# Patient Record
Sex: MALE | Race: WHITE | NOT HISPANIC OR LATINO | Employment: OTHER | URBAN - METROPOLITAN AREA
[De-identification: names, ages, dates, MRNs, and addresses within clinical notes are randomized per-mention and may not be internally consistent; named-entity substitution may affect disease eponyms.]

---

## 2019-06-20 RX ORDER — ASPIRIN 81 MG/1
81 TABLET ORAL DAILY
COMMUNITY

## 2019-06-20 RX ORDER — ATORVASTATIN CALCIUM 20 MG/1
20 TABLET, FILM COATED ORAL DAILY
COMMUNITY

## 2019-06-20 RX ORDER — FELODIPINE 10 MG/1
10 TABLET, EXTENDED RELEASE ORAL DAILY
COMMUNITY

## 2019-06-20 RX ORDER — TAMSULOSIN HYDROCHLORIDE 0.4 MG/1
1 CAPSULE ORAL NIGHTLY
COMMUNITY

## 2019-06-21 ENCOUNTER — OFFICE VISIT (OUTPATIENT)
Dept: NEUROSURGERY | Facility: CLINIC | Age: 79
End: 2019-06-21

## 2019-06-21 VITALS — HEIGHT: 72 IN | RESPIRATION RATE: 17 BRPM | WEIGHT: 193.4 LBS | BODY MASS INDEX: 26.19 KG/M2

## 2019-06-21 DIAGNOSIS — M54.16 LUMBAR RADICULOPATHY: ICD-10-CM

## 2019-06-21 DIAGNOSIS — M48.062 SPINAL STENOSIS OF LUMBAR REGION WITH NEUROGENIC CLAUDICATION: Primary | ICD-10-CM

## 2019-06-21 DIAGNOSIS — M51.36 DDD (DEGENERATIVE DISC DISEASE), LUMBAR: ICD-10-CM

## 2019-06-21 PROCEDURE — 99203 OFFICE O/P NEW LOW 30 MIN: CPT | Performed by: NEUROLOGICAL SURGERY

## 2019-06-21 NOTE — PROGRESS NOTES
NAME: DARREL VELIZ   DOS: 2019  : 1940  PCP: Norman Gomez DO    Chief Complaint:    Chief Complaint   Patient presents with   • Low back, left buttock pain   • Neck stiffness       History of Present Illness:  78 y.o. male   I saw this 78-year-old gentleman very pleasant gentleman with a history of some chronic axial back pain as well as cervical issues    He has a history of chronic back pain is worse on the left-hand side and occasionally radiates down into his hip it does not go down into the legs he denies any clear-cut weakness and he does have occasional symptoms of neurogenic claudication but they are few and far between he has pain in his sacral area as well he denies cauda equina syndrome    More recently he has had problems with numbness in his left upper extremity he also complains of some cervical left paraspinal pain as well he denies any weakness numbness tingling in his other hand he denies any other cranial symptoms    PMHX  Allergies:  No Known Allergies  Medications    Current Outpatient Medications:   •  aspirin 81 MG EC tablet, Take 81 mg by mouth Daily., Disp: , Rfl:   •  atorvastatin (LIPITOR) 20 MG tablet, Take 20 mg by mouth Daily., Disp: , Rfl:   •  felodipine (PLENDIL) 10 MG 24 hr tablet, Take 10 mg by mouth Daily., Disp: , Rfl:   •  tamsulosin (FLOMAX) 0.4 MG capsule 24 hr capsule, Take 1 capsule by mouth Every Night., Disp: , Rfl:   Past Medical History:  Past Medical History:   Diagnosis Date   • Cancer (CMS/HCC)     Prostate   • Heart disease    • Low back pain      Past Surgical History:  Past Surgical History:   Procedure Laterality Date   • ROTATOR CUFF REPAIR Right      Social Hx:  Social History     Tobacco Use   • Smoking status: Never Smoker   • Smokeless tobacco: Never Used   Substance Use Topics   • Alcohol use: No     Frequency: Never   • Drug use: No     Family Hx:  Family History   Problem Relation Age of Onset   • Hypertension Father       Review of Systems:        Review of Systems   Constitutional: Negative for activity change, appetite change, chills, diaphoresis, fatigue, fever and unexpected weight change.   HENT: Negative for congestion, dental problem, drooling, ear discharge, ear pain, facial swelling, hearing loss, mouth sores, nosebleeds, postnasal drip, rhinorrhea, sinus pressure, sneezing, sore throat, tinnitus, trouble swallowing and voice change.    Eyes: Negative for photophobia, pain, discharge, redness, itching and visual disturbance.   Respiratory: Negative for apnea, cough, choking, chest tightness, shortness of breath, wheezing and stridor.    Cardiovascular: Negative for chest pain, palpitations and leg swelling.   Gastrointestinal: Negative for abdominal distention, abdominal pain, anal bleeding, blood in stool, constipation, diarrhea, nausea, rectal pain and vomiting.   Endocrine: Negative for cold intolerance, heat intolerance, polydipsia, polyphagia and polyuria.   Genitourinary: Negative for decreased urine volume, difficulty urinating, dysuria, enuresis, flank pain, frequency, genital sores, hematuria and urgency.   Musculoskeletal: Positive for back pain and neck stiffness. Negative for arthralgias, gait problem, joint swelling, myalgias and neck pain.   Skin: Negative for color change, pallor, rash and wound.   Allergic/Immunologic: Negative for environmental allergies, food allergies and immunocompromised state.   Neurological: Negative for dizziness, tremors, seizures, syncope, facial asymmetry, speech difficulty, weakness, light-headedness, numbness and headaches.   Hematological: Negative for adenopathy. Does not bruise/bleed easily.   Psychiatric/Behavioral: Negative for agitation, behavioral problems, confusion, decreased concentration, dysphoric mood, hallucinations, self-injury, sleep disturbance and suicidal ideas. The patient is not nervous/anxious and is not hyperactive.    All other systems reviewed and are  negative.       I have reviewed this note template and all pertinent parts of the review of systems social, family history, surgical history and medication list    Physical Examination:  Vitals:    06/21/19 1359   Resp: 17      General Appearance:   Well developed, well nourished, well groomed, alert, and cooperative.  Neurological examination:  Neurologic Exam    Vital signs were reviewed and documented in the chart  Patient appeared in good neurologic function with normal comprehension fluent speech  Mood and affect are normal  Sense of smell deferred    Pupils symmetric equally reactive funduscopic exam not visualized   Visual fields intact to confrontation  Extraocular movements intact  Face motor function is symmetric  Facial sensations normal  Hearing intact to finger rub hearing intact to finger rub  Tongue is midline  Palate symmetric  Swallowing normal  Shoulder shrug normal    Muscle bulk and tone normal  5 out of 5 strength no motor drift  Gait normal intact  Negative Romberg  No clonus long tract signs or myelopathy  He has no Pike's, Tinel's bilateral  Reflexes symmetric he is extremely brisk in his upper extremities more so than his lower extremities he has trace ankle reflexes bilateral  Mild pedal edema noted and extremities skin appears normal  He has no clonus     He has decreased vibratory sensation more so on the right arm and leg than the left  His back is clear without lesions      Straight leg raise sign absent  No signs of intrinsic hip dysfunction with relatively good range of motion for age  Back is without any lesions or abnormality  Feet are warm and well perfused        Review of Imaging/DATA:  I reviewed his MRI is relatively impressive he has disease at 2 3 that is mild to moderate he has significant spinal stenosis at L3-4 L4-5 and L5-S1 he is mostly on the left at 5 1  Diagnoses/Plan:    Mr. Gunderson is a 78 y.o. male   I discussed with him the treatment options I think for his  low back he definitely has some mid sacral SI joint pain he has severe lateral recess syndrome and I think he is a candidate for a lumbar laminectomy at 4 5 with undercutting 3 4 he may be able to do this for a left-sided approach only I think you do very well with the surgery that being said I do not like the asymmetry in his vibratory sensation also do not like his market hyperreflexia in his upper extremities I am to get a cervical MRI as well as EMG of his left upper extremity and lower extremities to look for peripheral neuropathy I would like to also lumbar flexion-extension film to exclude mechanical instability.

## 2019-07-01 ENCOUNTER — HOSPITAL ENCOUNTER (OUTPATIENT)
Dept: MRI IMAGING | Facility: HOSPITAL | Age: 79
Discharge: HOME | End: 2019-07-01
Admitting: NEUROLOGICAL SURGERY

## 2019-07-01 ENCOUNTER — OFFICE VISIT (OUTPATIENT)
Dept: NEUROSURGERY | Facility: CLINIC | Age: 79
End: 2019-07-01

## 2019-07-01 ENCOUNTER — HOSPITAL ENCOUNTER (OUTPATIENT)
Dept: GENERAL RADIOLOGY | Facility: HOSPITAL | Age: 79
Discharge: HOME | End: 2019-07-01

## 2019-07-01 VITALS
SYSTOLIC BLOOD PRESSURE: 148 MMHG | TEMPERATURE: 98.1 F | BODY MASS INDEX: 24.26 KG/M2 | WEIGHT: 189 LBS | HEIGHT: 74 IN | DIASTOLIC BLOOD PRESSURE: 70 MMHG

## 2019-07-01 DIAGNOSIS — M48.062 SPINAL STENOSIS OF LUMBAR REGION WITH NEUROGENIC CLAUDICATION: ICD-10-CM

## 2019-07-01 DIAGNOSIS — G95.9 CERVICAL MYELOPATHY (HCC): Primary | ICD-10-CM

## 2019-07-01 DIAGNOSIS — M48.02 SPINAL STENOSIS IN CERVICAL REGION: ICD-10-CM

## 2019-07-01 PROCEDURE — 72141 MRI NECK SPINE W/O DYE: CPT

## 2019-07-01 PROCEDURE — 99214 OFFICE O/P EST MOD 30 MIN: CPT | Performed by: NEUROLOGICAL SURGERY

## 2019-07-01 PROCEDURE — 72114 X-RAY EXAM L-S SPINE BENDING: CPT

## 2019-07-01 NOTE — PROGRESS NOTES
"  NAME: DARREL VELIZ   DOS: 2019  : 1940  PCP: Norman Gomez DO    Chief Complaint:    Chief Complaint   Patient presents with   • Left sided neck pain     F/u MRI, XR & EMG   • Back Pain       History of Present Illness:  78 y.o. male   Follow-up back and left leg pain no new symptomatology denies any flagrant radiculopathy only has left parascapular pain as well as left paraspinal pain denies significant rheumatoid disease neurogenic claudication is able to walk over a mile and \"feels better \"    PMHX  Allergies:  No Known Allergies  Medications    Current Outpatient Medications:   •  aspirin 81 MG EC tablet, Take 81 mg by mouth Daily., Disp: , Rfl:   •  atorvastatin (LIPITOR) 20 MG tablet, Take 20 mg by mouth Daily., Disp: , Rfl:   •  felodipine (PLENDIL) 10 MG 24 hr tablet, Take 10 mg by mouth Daily., Disp: , Rfl:   •  tamsulosin (FLOMAX) 0.4 MG capsule 24 hr capsule, Take 1 capsule by mouth Every Night., Disp: , Rfl:   Past Medical History:  Past Medical History:   Diagnosis Date   • Cancer (CMS/HCC)     Prostate   • Heart disease    • Low back pain      Past Surgical History:  Past Surgical History:   Procedure Laterality Date   • ROTATOR CUFF REPAIR Right      Social Hx:  Social History     Tobacco Use   • Smoking status: Never Smoker   • Smokeless tobacco: Never Used   Substance Use Topics   • Alcohol use: No     Frequency: Never   • Drug use: No     Family Hx:  Family History   Problem Relation Age of Onset   • Hypertension Father      Review of Systems:        Review of Systems   Constitutional: Negative for activity change, appetite change, chills, diaphoresis, fatigue, fever and unexpected weight change.   HENT: Negative for congestion, dental problem, drooling, ear discharge, ear pain, facial swelling, hearing loss, mouth sores, nosebleeds, postnasal drip, rhinorrhea, sinus pressure, sneezing, sore throat, tinnitus, trouble swallowing and voice change.    Eyes: Negative for " photophobia, pain, discharge, redness, itching and visual disturbance.   Respiratory: Negative for apnea, cough, choking, chest tightness, shortness of breath, wheezing and stridor.    Cardiovascular: Negative for chest pain, palpitations and leg swelling.   Gastrointestinal: Negative for abdominal distention, abdominal pain, anal bleeding, blood in stool, constipation, diarrhea, nausea, rectal pain and vomiting.   Endocrine: Negative for cold intolerance, heat intolerance, polydipsia, polyphagia and polyuria.   Genitourinary: Negative for decreased urine volume, difficulty urinating, dysuria, enuresis, flank pain, frequency, genital sores, hematuria and urgency.   Musculoskeletal: Positive for back pain and neck stiffness. Negative for arthralgias, gait problem, joint swelling, myalgias and neck pain.   Skin: Negative for color change, pallor, rash and wound.   Allergic/Immunologic: Negative for environmental allergies, food allergies and immunocompromised state.   Neurological: Negative for dizziness, tremors, seizures, syncope, facial asymmetry, speech difficulty, weakness, light-headedness, numbness and headaches.   Hematological: Negative for adenopathy. Does not bruise/bleed easily.   Psychiatric/Behavioral: Negative for agitation, behavioral problems, confusion, decreased concentration, dysphoric mood, hallucinations, self-injury, sleep disturbance and suicidal ideas. The patient is not nervous/anxious and is not hyperactive.    All other systems reviewed and are negative.           Physical Examination:  Vitals:    07/01/19 1447   BP: 148/70   Temp: 98.1 °F (36.7 °C)      General Appearance:   Well developed, well nourished, well groomed, alert, and cooperative.  Neurological examination:  Neurologic Exam  He is awake alert and has good range of motion in his neck and arms he has no layer meets phenomenon    His strength is good in his upper extremities    He has good strength lower extremities    Vibratory  sensation is intact    He is very hyperreflexic in his arms but he has no Pike's    He has no clonus his toes are downgoing    His gait appears normal for age    Review of Imaging/DATA:  His MRI of the cervical spine was reviewed that shows a relatively tight stenosis at C to 3 there is a minimal amount of encephalomalacia he has a left C4-5 disc flexion-extension lumbar spine unremarkable  Diagnoses/Plan:    Mr. Gunderson is a 78 y.o. male   This is an interesting case the gentleman comes with left paraspinal pain he presents as well as some occasional left para spinal cervical pain.  I got an MRI to check for his hyperreflexia and he indeed has very high-grade stenosis of the cervical spine with encephalomalacia that appears that could be chronic.  He denies any ictus he has no liver meets he has no Pike's he has mild hyperreflexia is able to walk and has no clonus.  From my standpoint we discussed that at his age we counseled him on fall risk precautions he is in a see me back to monitor his exam in 6 months.  He does have significant lumbar spinal stenosis as well and from a neurosurgical standpoint he would be a candidate for a C3 laminectomy with undercutting C2 once we ensure stability if he progressed.  If he does develop symptoms of neurogenic claudication we could also contemplate treatment there as well.  He seems very comfort with the plan he wishes to avoid surgery at all costs if not necessary given the incidental nature of his cervical spine MRI I think that is reasonable.  We will monitor him and see him back     Normal for race

## 2019-07-10 ENCOUNTER — HOSPITAL ENCOUNTER (OUTPATIENT)
Dept: PHYSICAL THERAPY | Facility: HOSPITAL | Age: 79
Setting detail: THERAPIES SERIES
Discharge: HOME | End: 2019-07-10

## 2019-07-10 ENCOUNTER — TELEPHONE (OUTPATIENT)
Dept: PAIN MEDICINE | Facility: CLINIC | Age: 79
End: 2019-07-10

## 2019-07-10 DIAGNOSIS — M48.062 SPINAL STENOSIS OF LUMBAR REGION WITH NEUROGENIC CLAUDICATION: Primary | ICD-10-CM

## 2019-07-10 PROCEDURE — 97161 PT EVAL LOW COMPLEX 20 MIN: CPT | Performed by: PHYSICAL THERAPIST

## 2019-07-10 NOTE — THERAPY EVALUATION
Outpatient Physical Therapy Ortho Initial Evaluation  [facility]     Patient Name: Frank Gunderson  : 1940  MRN: [id number]  Today's Date: 7/10/2019      Visit Date: 07/10/2019    There is no problem list on file for this patient.       Past Medical History:   Diagnosis Date   • Cancer (CMS/[facility])     Prostate   • Heart disease    • Low back pain         Past Surgical History:   Procedure Laterality Date   • ROTATOR CUFF REPAIR Right        Visit Dx:     ICD-10-CM ICD-9-CM   1. Spinal stenosis of lumbar region with neurogenic claudication M48.062 724.03         Patient History     Row Name 07/10/19 0800             History    Chief Complaint  Pain  -CR      Type of Pain  Back pain  -CR      Date Current Problem(s) Began  07/10/14  -CR      Brief Description of Current Complaint  77 yo male reports history of llumbar stenosis as well as cervical pathology with left hip pain with ambulation and prolonged standing.  Client indicates constant left finger tingling.  Chief complaint appears to be left hip discomfort at times of increased activity.  Client denies falls or balance difficulty.    -CR      Previous treatment for THIS PROBLEM  Injections  -CR      Patient/Caregiver Goals  Relieve pain  -CR      Current Tobacco Use  no  -CR      Smoking Status  no  -CR      Hand Dominance  right-handed  -CR      What clinical tests have you had for this problem?  MRI  -CR         Pain     Pain Location  Hip  -CR      Pain Frequency  Several days a week;Intermittent  -CR      What Performance Factors Make the Current Problem(s) WORSE?  satnding, walking slow  -CR      Tolerance Time- Standing  30  -CR      Is your sleep disturbed?  Yes  -CR      Is medication used to assist with sleep?  No  -CR      Difficulties at work?  na  -CR      Difficulties with ADL's?  singing in choir  -CR      Difficulties with recreational activities?  yes  -CR         Fall Risk Assessment    Any falls in the past year:  No  -CR       Does patient have a fear of falling  No  -CR         Daily Activities    Primary Language  English  -CR      Patient is concerned about/has problems with  Performing sports, recreation, and play activities;Performing home management (household chores, shopping, care of dependents)  -CR        User Key  (r) = Recorded By, (t) = Taken By, (c) = Cosigned By    Initials Name Provider Type    Umer Johnson, MARLEN Physical Therapist          PT Ortho     Row Name 07/10/19 0900       Posture/Observations    Alignment Options  Forward head;Lumbar lordosis  -CR    Forward Head  Moderate  -CR    Lumbar lordosis  Decreased  -CR    Posture/Observations Comments  Maintains slightly forward flexed posture  -CR       Quarter Clearing    Quarter Clearing  Lower Quarter Clearing  -CR       DTR- Lower Quarter Clearing    Patellar tendon (L2-4)  2- Normal response  -CR    Achilles tendon (S1-2)  2- Normal response  -CR       Neural Tension Signs- Lower Quarter Clearing    Slump  Negative  -CR    SLR  Negative  -CR       Lumbar ROM Screen- Lower Quarter Clearing    Lumbar Flexion  Normal  -CR    Lumbar Extension  Impaired  -CR       SI/Hip Screen- Lower Quarter Clearing    Erlin's/Nicolas's test  Left:;Negative  -CR    Posterior thigh sheer  Left:;Negative  -CR       Special Tests/Palpation    Special Tests/Palpation  Lumbar/SI;Hip  -CR       Lumbosacral Accessory Motions    Lumbosacral Accessory Motions Tested?  Yes  -CR    PA Glide- L1  Hypomobile  -CR    PA Glide- L2  Hypomobile  -CR    PA Glide- L3  Hypomobile  -CR    PA Glide- L4  Hypomobile  -CR    PA Glide- L5  Hypomobile  -CR       Lumbar/SI Special Tests    Stork Test (SI Dysfunction)  Left:;Negative  -CR    Slump Test (Neural Tension)  Left:;Negative  -CR    SLR (Neural Tension)  Left:;Negative  -CR    ERLIN (hip vs. SI Dysfunction)  Left:;Negative  -CR       Lumbosacral Palpation    Lumbosacral Palpation?  Yes  -CR    Piriformis  Tender;Left:  -CR    Erector Spinae  (Paraspinals)  Guarded/taut  -CR       Hip/Thigh Palpation    Hip/Thigh Palpation?  No Tenderness/Abnormality  -CR       Hip Special Tests    ERLIN (hip vs SI pathology)  Negative  -CR       General ROM    LT Lower Ext  Lt Hip Flexion;Lt Hip External Rotation;Lt Hip Internal Rotation  -CR       Left Lower Ext    Lt Hip Flexion PROM  120  -CR    Lt Hip External Rotation PROM  45  -CR    Lt Hip Internal Rotation PROM  15  -CR       Flexibility    Flexibility Tested?  Lower Extremity  -CR       Lower Extremity Flexibility    Hamstrings  Bilateral:;Moderately limited  -CR    Quadriceps  Right:;Severely limited;Left:;Moderately limited  -CR      User Key  (r) = Recorded By, (t) = Taken By, (c) = Cosigned By    Initials Name Provider Type    Umer Johnson PT Physical Therapist                      Therapy Education  Education Details: Client provided written HEP including LTR, Lumbar flexion, and piriformis stretching  Given: HEP, Pain management, Symptoms/condition management, Posture/body mechanics  Program: New  How Provided: Verbal, Demonstration, Written  Provided to: Patient  Level of Understanding: Verbalized, Demonstrated     PT OP Goals     Row Name 07/10/19 0900          PT Short Term Goals    STG Date to Achieve  08/07/19  -CR     STG 1  client will demonstrates indpendence with initial HEP  -CR     STG 1 Progress  New  -CR     STG 2  client will report increase in standing tolerance to 1 hour without pain  -CR     STG 2 Progress  New  -CR        Long Term Goals    LTG Date to Achieve  09/04/19  -CR        Time Calculation    PT Goal Re-Cert Due Date  10/08/19  -CR       User Key  (r) = Recorded By, (t) = Taken By, (c) = Cosigned By    Initials Name Provider Type    Umer Johnson PT Physical Therapist          PT Assessment/Plan     Row Name 07/10/19 1026          PT Assessment    Functional Limitations  Performance in leisure activities;Performance in self-care ADL  -CR     Impairments   Gait;Poor body mechanics;Posture;Range of motion;Pain;Joint mobility  -CR     Assessment Comments  77 yo male arrives with evolving symptoms of low complexity. He reports history of chronic low back pain.  Client will benefit from skilled services to address impairments and work towards self management.    -CR     Please refer to paper survey for additional self-reported information  Yes  -CR     Rehab Potential  Good  -CR     Patient/caregiver participated in establishment of treatment plan and goals  Yes  -CR     Patient would benefit from skilled therapy intervention  Yes  -CR        PT Plan    PT Frequency  1x/week  -CR     Predicted Duration of Therapy Intervention (Therapy Eval)  6 visits  -CR     Planned CPT's?  PT EVAL LOW COMPLEXITY: 96345;PT THER PROC EA 15 MIN: 50548;PT MANUAL THERAPY EA 15 MIN: 20601;PT HOT/COLD PACK WC NONMCARE: 68728;PT RE-EVAL: 96346;PT THER ACT EA 15 MIN: 42335;PT NEUROMUSC RE-EDUCATION EA 15 MIN: 87283;PT SELF CARE/HOME MGMT/TRAIN EA 15: 54568  -CR     Physical Therapy Interventions (Optional Details)  home exercise program;joint mobilization;lumbar stabilization;ROM (Range of Motion);neuromuscular re-education;postural re-education;patient/family education;strengthening;stretching  -CR     PT Plan Comments  back and hip stretching and mobility, self care and pain management  -CR       User Key  (r) = Recorded By, (t) = Taken By, (c) = Cosigned By    Initials Name Provider Type    CR Umer Perera, PT Physical Therapist                              Outcome Measure Options: Modifed Owestry  Modified Oswestry  Modified Oswestry Score/Comments: 3/50      Time Calculation:     Start Time: 0845     Therapy Charges for Today     Code Description Service Date Service Provider Modifiers Qty    [id number]  PT EVAL LOW COMPLEXITY 3 7/10/2019 Umer Perera, PT GP 1          PT G-Codes  Outcome Measure Options: Modifed Owestry  Modified Oswestry Score/Comments: 3/50          Umer Perera, PT  7/10/2019

## 2019-07-11 NOTE — PROGRESS NOTES
"Chief Complaint: \"Pain in my lower back and left hip.\"       History of Present Illness:   Patient: Mr. Frank Gunderson, 78 y.o. male   Referring physician: Dr. Marcell Barrow   Reason for referral: Consultation for intractable chronic left hip pain.   Pain history: Patient reports a 7-8 year history of pain, which began without incident. Pain has progressed in intensity over the past 1 year.   Pain description: constant pain with intermittent exacerbation, described dull and aching sensation.   Radiation of pain: The lower back pain radiates into the left hip   Pain intensity today: 0/10 (sitting) 5/10 (standing)  Average pain intensity last week: 3/10  Pain intensity ranges from: 0/10 to 5/10  Aggravating factors: Pain increases with standing longer than 15 minutes.  Alleviating factors: Pain decreases with sitting, standing and lying down     Associated symptoms:   Patient denies pain, numbness or weakness in the lower extremities.   Patient denies any new bladder or bowel problems.   Patient denies difficulties with his balance or recent falls     Review of previous therapies and additional medical records:  Frank Gunderson has already failed the following measures,  including:   Conservative measures: oral analgesics, physical therapy, ice and heat   Interventional measures: Patient had 2 \"injections\" at [facility]  Surgical measures: No history of lumbar spine or hip surgery  Frank Gunderson underwent neurosurgical consultation with Dr. Barrow on 07/01/2019, and was found to be a potential surgical candidate.  Frank Gunderson is a healthy adult other than his chronic pain and prostate cancer Tx'd with seeding  In terms of current analgesics, Frank Gunderson takes: Nothing  I have reviewed Alex Report #[id number] consistent to medication reconciliation.     Global Pain Scale 07-18 2019                 Pain  3                 Feelings  0                 Clinical outcomes  0               "   Activities  0                 GPS Total:  3                    Review of Diagnostic Studies:    X-Ray Lumbar Spine on 07/01/2019: minimal retrolisthesis of L2 on L3. Vertebral body heights are preserved. Intervertebral disc height space loss L2-L3 through L5-S1 levels with degenerative disc disease and adjacent facet arthropathy in the posterior elements including osseous neuroforaminal narrowing at these levels greatest at L5-S1. Flexion and extension views demonstrate stability of overall alignment. Lateral oblique views without evidence for pars interarticularis defects however partially obscured due to overlying degenerative changes and facet arthropathy. SI joints symmetric and without widening. No displaced pelvic ring fracture. Femoral heads well located bilaterally with mild to moderate DJD present. No acute osseous findings of the pelvis or proximal femurs.  EMG on 07/01/2019: Moderate chronic left L5-S1 radiculopathy.  Moderate to severe right carpal tunnel syndrome. Moderate chronic left C6-C7 radiculopathy. Mild underlying peripheral polyneuropathy  MRI of the lumbar spine without contrast on March 5, 2019, radiology report: There are endplate changes at several levels. Mild retrolisthesis of L2 on L3.  The conus has an unremarkable appearance.  Axial imaging:   L1-L2: Annular bulge, facet arthropathy, vertebral osteophytes. Mild right and moderate left neuroforaminal stenosis.  Left lateral recess stenosis   L2-L3: Annular bulge, facet arthropathy, vertebral osteophytes. Left foraminal disc protrusion.  Mild right and moderate left neuroforaminal stenosis   L3-L4: Annular bulge, facet arthropathy, vertebral osteophytes. Moderate right and severe left neuroforaminal stenosis.  Mild central canal stenosis with an AP thecal sac diameter of 8 mm   L4-L5: Annular bulge, facet arthropathy, vertebral osteophytes. Bilateral lateral recess stenosis.  Moderate central canal stenosis with an AP thecal sac  diameter of 6 mm.  Severe bilateral neuroforaminal stenosis   L5-S1: Annular bulge, facet arthropathy, vertebral osteophytes. Moderate right and severe left neuroforaminal stenosis.   MRI CERVICAL SPINE WO CONTRAST- 07/01/2019: There is extensive degenerative changes identified of C2/C3 level with mild anterolisthesis of C4 on C5. Degenerative changes as well seen of C5/C6 and C6/C7. Craniovertebral junction is preserved.  Degenerative changes seen within the posterior facets. No abnormal mass or fluid collection seen within the paraspinal muscles. Axial imaging:  C2-C3: severe central spinal canal stenosis with mass effect and signal abnormality seen within the spinal cord. There is increased signal to suggest chronic myelopathy change. There is narrowing of the neuroforamina bilaterally. Degenerative changes within the posterior facets with thickening of the posterior ligamentum flavum.  C3-C4: posterior disc osteophyte complex creating no significant mass effect on the thecal sac. No neuroforaminal stenosis  C4-C5: mild anterolisthesis of C4 on C5. Broad-based disc bulge creating no significant central spinal canal stenosis or nerve root compromise.  C5-C6: small posterior disc osteophyte complex creating some mass effect anteriorly on the leftward aspect of the thecal sac with no significant central spinal canal stenosis or nerve root compromise.  C6-C7: posterior disc osteophyte complex creating mass effect anteriorly on the thecal sac with narrowing of the left neuroforamina. There is mass effect on the nerve roots bilaterally. Moderate central spinal canal stenosis.      Review of Systems   Musculoskeletal: Positive for back pain and neck stiffness.   Neurological: Positive for numbness.   All other systems reviewed and are negative.        Patient Active Problem List   Diagnosis   • Lumbar stenosis with neurogenic claudication   • Spinal stenosis of cervical region   • Chronic lumbar radiculopathy   •  "Chronic cervical radiculopathy   • Peripheral polyneuropathy       Past Medical History:   Diagnosis Date   • Cancer (CMS/HCC)     Prostate   • Heart disease    • Low back pain          Past Surgical History:   Procedure Laterality Date   • ROTATOR CUFF REPAIR Right          Family History   Problem Relation Age of Onset   • Hypertension Father          Social History     Socioeconomic History   • Marital status:      Spouse name: Not on file   • Number of children: Not on file   • Years of education: Not on file   • Highest education level: Not on file   Tobacco Use   • Smoking status: Never Smoker   • Smokeless tobacco: Never Used   Substance and Sexual Activity   • Alcohol use: No     Frequency: Never   • Drug use: No   • Sexual activity: Defer           Current Outpatient Medications:   •  aspirin 81 MG EC tablet, Take 81 mg by mouth Daily., Disp: , Rfl:   •  atorvastatin (LIPITOR) 20 MG tablet, Take 20 mg by mouth Daily., Disp: , Rfl:   •  felodipine (PLENDIL) 10 MG 24 hr tablet, Take 10 mg by mouth Daily., Disp: , Rfl:   •  tamsulosin (FLOMAX) 0.4 MG capsule 24 hr capsule, Take 1 capsule by mouth Every Night., Disp: , Rfl:       No Known Allergies      /72 (BP Location: Left arm, Patient Position: Sitting)   Temp 98.1 °F (36.7 °C) (Temporal)   Ht 188 cm (74\")   Wt 88 kg (194 lb)   BMI 24.91 kg/m²       Physical Exam:  Constitutional: Patient is oriented to person, place, and time. Patient appears well-developed and well-nourished.   HEENT: Head: Normocephalic and atraumatic. Eyes: Conjunctivae and lids are normal. Pupils: Equal, round, reactive to light.   Neck: Trachea normal. Neck supple. No JVD present.   Lymphatic: No cervical adenopathy  Pulmonary Respiratory effort: No increased work of breathing or signs of respiratory distress. Auscultation of lungs: Clear to auscultation.   Cardiovascular Auscultation of heart: Normal rate and rhythm, normal S1 and S2, no murmurs.   Peripheral " vascular exam: Femoral: right 2+, left 2+. Posterior tibialis: right 2+ and left 2+. Dorsalis pedis: right 2+ and left 2+. No edema.   Musculoskeletal   Gait and station: Gait evaluation demonstrated a normal gait   Cervical spine: Passive and active range of motion are limited secondary to pain. Extension and rotation of the cervical spine increased and reproduced pain. Cervical facet joint loading maneuvers are positive.  Muscles: Presence of active trigger points; none  Shoulders: The range of motion of the glenohumeral joints is full and without pain. Rotator cuff strength is 5/5.   Lumbar spine: Passive and active range of motion are appropriate for his age and condition. Extension, flexion, lateral flexion, rotation of the lumbar spine did not increase or reproduce pain. Lumbar facet joint loading maneuvers are negative.   Nicolas test and Gaenslen's test are negative   Piriformis maneuvers are negative   Palpation of the bilateral ischial tuberosities, unrevealing   Palpation of the bilateral greater trochanters, unrevealing   Examination of the Iliotibial band: unrevealing   Hip joints: The range of motion of the hip joints is almost full and without pain   Neurological: Patient is alert and oriented to person, place, and time. Speech: speech is normal. Cortical function: Normal mental status.   Cranial nerves: Cranial nerves 2-12 intact.   Reflex Scores:  Right brachioradialis: 3+  Left brachioradialis: 3+  Right biceps: 3+  Left biceps: 3+  Right triceps: 3+  Left triceps: 3+  Right patellar: 3+  Left patellar: 3+  Right Achilles: 2+  Left Achilles: 2+  Motor strength: 5/5  Motor Tone: normal tone.   Involuntary movements: none.   Superficial/Primitive Reflexes: primitive reflexes were absent.   Right Pike: absent  Left Pike: absent  Right ankle clonus: absent  Left ankle clonus: absent   Spurling sign is negative. Neck tornado test is negative. Lhermitte sign is negative. Negative long tract signs.  Straight leg raising test is negative. Femoral stretch sign is negative.   Sensation: No sensory loss. Sensory exam: intact to light touch, intact pain and temperature sensation, intact vibration sensation and normal proprioception.   Coordination: Normal finger to nose and heel to shin. Normal balance and negative Romberg's sign   Skin and subcutaneous tissue: Skin is warm and intact. No rash noted. No cyanosis.   Psychiatric: Judgment and insight: Normal. Orientation to person, place and time: Normal. Recent and remote memory: Intact. Mood and affect: Normal.     ASSESSMENT:   1. Lumbar stenosis with neurogenic claudication    2. Chronic lumbar radiculopathy    3. Spinal stenosis of cervical region    4. Chronic cervical radiculopathy    5. Peripheral polyneuropathy        PLAN/MEDICAL DECISION MAKING: I had a lengthy conversation with Mr. Frank NATARAJAN Gaston regarding his chronic pain condition and potential therapeutic options including risks, benefits, alternative therapies, to name a few. Patient has a history of chronic lower back pain that radiates down into his left hip. Patient reports symptoms of neurogenic claudication.EMG on 07/01/2019 revealed moderate chronic left L5-S1 radiculopathy. MRI of the lumbar spine without contrast on March 5, 2019, diffuse degenerative changes and multilevel stenosis of various degrees, most significant at L3-L4: Annular bulge, facet arthropathy, vertebral osteophytes. Moderate right and severe left neuroforaminal stenosis. L4-L5: Annular bulge, facet arthropathy, vertebral osteophytes. Bilateral lateral recess stenosis. Moderate central canal stenosis with an AP thecal sac diameter of 6 mm. Severe bilateral neuroforaminal stenosis. L5-S1: Annular bulge, facet arthropathy, vertebral osteophytes. Moderate right and severe left neuroforaminal stenosis. Flexion-extension lumbar spine, unremarkable. MRI of the cervical spine shows severe stenosis at C2-C3 with minimal amount of  myelomalacia. Patient has failed to obtain pain relief with conservative measures, as referenced above. I have reviewed all available patient's medical records as well as previous therapies as referenced above.  Therefore, I have proposed the following plan:  1. Pharmacological measures: Reviewed. Discussed. Patient has declined pharmacological measures   2. Interventional pain management measures: Patient will be scheduled for diagnostic and therapeutic left L4-L5 and left L5-S1 transforaminal epidural steroid injections. We may repeat epidurals depending on patient's outcome. Patient will follow-up with Dr. Barrow thereafter. Potential candidate for a lumbar laminectomy at L4-L5 with undercutting L3-L4. If he is found not to be a surgical candidate, then, a SCS trial could be an option. For his neck issues, Dr. Barrow has discussed the possibility of C2-C3 laminectomy.    3. Long-term rehabilitation efforts:  A. The patient does not have a history of falls. I did complete a risk assessment for falls  B. Patient will start a comprehensive physical therapy program for water therapy, therapeutic exercise, core strengthening, gait and balance training, neurodynamics, myofascial release, cupping and dry needling once pain is under control  C. Start an exercise program such as yoga, Pilates, water therapy and swimming  D. Contrast therapy: Apply ice-packs for 15-20 minutes, followed by heating pads for 15-20 minutes to affected area   4. The patient has been instructed to contact my office with any questions or difficulties. The patient understands the plan and agrees to proceed accordingly.       Patient Care Team:  Norman Gomez DO as PCP - General (Internal Medicine)  Marcell Barrow MD as Consulting Physician (Neurosurgery)     No orders of the defined types were placed in this encounter.        Future Appointments   Date Time Provider Department Center   7/19/2019  9:30 AM Umer Perera, MARLEN   [facility] None   7/23/2019  4:30 PM Umer Perera, PT  [facility] None   7/30/2019  3:30 PM Umer Perera, PT  [facility] None   8/7/2019  9:30 AM Umer Perera, PT  [facility] None   1/6/2020  1:00 PM Jake Camarena PA-C [facility] None         Fernando Frye MD     EMR Dragon/Transcription disclaimer:  Much of this encounter note is an electronic transcription of spoken language to printed text. Electronic transcription of spoken language may permit erroneous, or at times, nonsensical words or phrases to be inadvertently transcribed. Although I have reviewed the note for such errors, some may still exist.

## 2019-07-17 PROBLEM — G62.9 PERIPHERAL POLYNEUROPATHY: Status: ACTIVE | Noted: 2019-07-17

## 2019-07-17 PROBLEM — M48.062 LUMBAR STENOSIS WITH NEUROGENIC CLAUDICATION: Status: ACTIVE | Noted: 2019-07-17

## 2019-07-17 PROBLEM — M54.16 CHRONIC LUMBAR RADICULOPATHY: Status: ACTIVE | Noted: 2019-07-17

## 2019-07-17 PROBLEM — M54.12 CHRONIC CERVICAL RADICULOPATHY: Status: ACTIVE | Noted: 2019-07-17

## 2019-07-17 PROBLEM — M48.02 SPINAL STENOSIS OF CERVICAL REGION: Status: ACTIVE | Noted: 2019-07-17

## 2019-07-18 ENCOUNTER — OFFICE VISIT (OUTPATIENT)
Dept: PAIN MEDICINE | Facility: CLINIC | Age: 79
End: 2019-07-18

## 2019-07-18 VITALS
WEIGHT: 194 LBS | HEIGHT: 74 IN | DIASTOLIC BLOOD PRESSURE: 72 MMHG | TEMPERATURE: 98.1 F | SYSTOLIC BLOOD PRESSURE: 140 MMHG | BODY MASS INDEX: 24.9 KG/M2

## 2019-07-18 DIAGNOSIS — M54.12 CHRONIC CERVICAL RADICULOPATHY: ICD-10-CM

## 2019-07-18 DIAGNOSIS — G62.9 PERIPHERAL POLYNEUROPATHY: ICD-10-CM

## 2019-07-18 DIAGNOSIS — M48.02 SPINAL STENOSIS OF CERVICAL REGION: ICD-10-CM

## 2019-07-18 DIAGNOSIS — M54.16 CHRONIC LUMBAR RADICULOPATHY: ICD-10-CM

## 2019-07-18 DIAGNOSIS — M48.062 LUMBAR STENOSIS WITH NEUROGENIC CLAUDICATION: ICD-10-CM

## 2019-07-18 DIAGNOSIS — M48.062 LUMBAR STENOSIS WITH NEUROGENIC CLAUDICATION: Primary | ICD-10-CM

## 2019-07-18 PROCEDURE — 99203 OFFICE O/P NEW LOW 30 MIN: CPT | Performed by: ANESTHESIOLOGY

## 2019-07-19 ENCOUNTER — HOSPITAL ENCOUNTER (OUTPATIENT)
Dept: PHYSICAL THERAPY | Facility: HOSPITAL | Age: 79
Setting detail: THERAPIES SERIES
Discharge: HOME | End: 2019-07-19

## 2019-07-19 DIAGNOSIS — M48.062 SPINAL STENOSIS OF LUMBAR REGION WITH NEUROGENIC CLAUDICATION: Primary | ICD-10-CM

## 2019-07-19 PROCEDURE — 97110 THERAPEUTIC EXERCISES: CPT | Performed by: PHYSICAL THERAPIST

## 2019-07-19 NOTE — THERAPY TREATMENT NOTE
Outpatient Physical Therapy Ortho Treatment Note  [facility]     Patient Name: Frank Gunderson  : 1940  MRN: [id number]  Today's Date: 2019      Visit Date: 2019    Visit Dx:    ICD-10-CM ICD-9-CM   1. Spinal stenosis of lumbar region with neurogenic claudication M48.062 724.03       Patient Active Problem List   Diagnosis   • Lumbar stenosis with neurogenic claudication   • Spinal stenosis of cervical region   • Chronic lumbar radiculopathy   • Chronic cervical radiculopathy   • Peripheral polyneuropathy        Past Medical History:   Diagnosis Date   • Cancer (CMS/HCC)     Prostate   • Heart disease    • Low back pain         Past Surgical History:   Procedure Laterality Date   • ROTATOR CUFF REPAIR Right                        PT Assessment/Plan     Row Name 19 1010          PT Assessment    Assessment Comments  Client is tolerating initial TE and stretching program well.  Education for positioning and motion tolerances for low back positioning completed in session today.  Client is an active participant in therapy services and will benefit from ongoing managment with TE and stretching program.   -CR       User Key  (r) = Recorded By, (t) = Taken By, (c) = Cosigned By    Initials Name Provider Type    CR Umer Perera, PT Physical Therapist            Exercises     Row Name 19 0900             Subjective Comments    Subjective Comments  Raquel reports compliance with HEP and indicates he plans to receive injetion latera this month.  Reports stretching seems beneficial  -CR         Subjective Pain    Able to rate subjective pain?  yes  -CR      Pre-Treatment Pain Level  2  -CR         Total Minutes    31394 - PT Therapeutic Exercise Minutes  30  -CR         Exercise 1    Exercise Name 1  nu step  -CR      Time 1  5  -CR      Additional Comments  L5  -CR         Exercise 2    Exercise Name 2  LTR  -CR      Reps 2  30  -CR         Exercise 3    Exercise Name 3  SKTC  -CR       Reps 3  5  -CR      Time 3  30  -CR         Exercise 4    Exercise Name 4  Seated ball roll outs  -CR      Reps 4  20  -CR         Exercise 5    Exercise Name 5  Bridging  -CR      Reps 5  20  -CR         Exercise 6    Exercise Name 6  piriformis stretch  -CR      Reps 6  5  -CR      Time 6  30  -CR         Exercise 7    Exercise Name 7  leg press   -CR      Sets 7  3  -CR      Reps 7  10  -CR      Additional Comments  120#  -CR        User Key  (r) = Recorded By, (t) = Taken By, (c) = Cosigned By    Initials Name Provider Type    CR Umer Perera, PT Physical Therapist                                          Time Calculation:   Start Time: 0930  Therapy Charges for Today     Code Description Service Date Service Provider Modifiers Qty    25145962669 HC PT THER PROC EA 15 MIN 7/19/2019 Umer Perera, PT GP 2                    Umer Perera, PT  7/19/2019

## 2019-07-23 ENCOUNTER — HOSPITAL ENCOUNTER (OUTPATIENT)
Dept: PHYSICAL THERAPY | Facility: HOSPITAL | Age: 79
Setting detail: THERAPIES SERIES
Discharge: HOME | End: 2019-07-23

## 2019-07-23 DIAGNOSIS — M48.062 SPINAL STENOSIS OF LUMBAR REGION WITH NEUROGENIC CLAUDICATION: Primary | ICD-10-CM

## 2019-07-23 PROCEDURE — 97110 THERAPEUTIC EXERCISES: CPT | Performed by: PHYSICAL THERAPIST

## 2019-07-23 NOTE — THERAPY TREATMENT NOTE
Outpatient Physical Therapy Ortho Treatment Note   [facility]     Patient Name: Frank Gunderson  : 1940  MRN: [id number]  Today's Date: 2019      Visit Date: 2019    Visit Dx:    ICD-10-CM ICD-9-CM   1. Spinal stenosis of lumbar region with neurogenic claudication M48.062 724.03       Patient Active Problem List   Diagnosis   • Lumbar stenosis with neurogenic claudication   • Spinal stenosis of cervical region   • Chronic lumbar radiculopathy   • Chronic cervical radiculopathy   • Peripheral polyneuropathy        Past Medical History:   Diagnosis Date   • Cancer (CMS/HCC)     Prostate   • Heart disease    • Low back pain         Past Surgical History:   Procedure Laterality Date   • ROTATOR CUFF REPAIR Right                        PT Assessment/Plan     Row Name 19 6886          PT Assessment    Assessment Comments  Js demonstrates a good understanding of principles of management as well as reporting good symptom control.  He will be receiving injection next week and will trial HEP for self managment with follow up 1 additional visit.  Should symtpoms remain controlled, client will likely be appropriate for transition to HEP.   -CR        PT Plan    PT Plan Comments  Cont with POC, follow up on self management  -CR       User Key  (r) = Recorded By, (t) = Taken By, (c) = Cosigned By    Initials Name Provider Type    Umer Johnson, PT Physical Therapist            Exercises     Row Name 19 1600             Subjective Comments    Subjective Comments  Client reports feelign well overall   -CR         Subjective Pain    Able to rate subjective pain?  yes  -CR      Pre-Treatment Pain Level  0  -CR      Post-Treatment Pain Level  0  -CR         Total Minutes    85002 - PT Therapeutic Exercise Minutes  30  -CR         Exercise 1    Exercise Name 1  nu step  -CR      Time 1  5  -CR      Additional Comments  L5  -CR         Exercise 2    Exercise Name 2  LTR  -CR      Reps 2   30  -CR         Exercise 3    Exercise Name 3  SKTC  -CR      Reps 3  5  -CR      Time 3  30  -CR         Exercise 4    Exercise Name 4  Seated ball roll outs  -CR      Reps 4  30  -CR         Exercise 5    Exercise Name 5  Bridging  -CR      Reps 5  20  -CR         Exercise 6    Exercise Name 6  piriformis stretch  -CR      Reps 6  5  -CR      Time 6  30  -CR      Additional Comments  seated  -CR         Exercise 7    Exercise Name 7  leg press   -CR      Sets 7  2  -CR      Reps 7  15  -CR      Additional Comments  140#  -CR        User Key  (r) = Recorded By, (t) = Taken By, (c) = Cosigned By    Initials Name Provider Type    CR Umer Perera, PT Physical Therapist                           Therapy Education  Program: Reinforced              Time Calculation:   Start Time: 1615  Therapy Charges for Today     Code Description Service Date Service Provider Modifiers Qty    92220727660 HC PT THER PROC EA 15 MIN 7/23/2019 Umer Perera, PT GP 2                    Umer Perera, PT  7/23/2019

## 2019-07-29 ENCOUNTER — OUTSIDE FACILITY SERVICE (OUTPATIENT)
Dept: PAIN MEDICINE | Facility: CLINIC | Age: 79
End: 2019-07-29

## 2019-07-29 PROCEDURE — 64483 NJX AA&/STRD TFRM EPI L/S 1: CPT | Performed by: ANESTHESIOLOGY

## 2019-07-29 PROCEDURE — 64484 NJX AA&/STRD TFRM EPI L/S EA: CPT | Performed by: ANESTHESIOLOGY

## 2019-07-30 ENCOUNTER — TELEPHONE (OUTPATIENT)
Dept: PAIN MEDICINE | Facility: CLINIC | Age: 79
End: 2019-07-30

## 2019-07-30 NOTE — TELEPHONE ENCOUNTER
Patient had dx/tx left L4-L5 and left L5-S1 TFESI on 07/29/19. Spoke with patient. He reports that he is doing well

## 2019-08-09 ENCOUNTER — HOSPITAL ENCOUNTER (OUTPATIENT)
Dept: PHYSICAL THERAPY | Facility: HOSPITAL | Age: 79
Setting detail: THERAPIES SERIES
Discharge: HOME | End: 2019-08-09

## 2019-08-09 DIAGNOSIS — M48.062 SPINAL STENOSIS OF LUMBAR REGION WITH NEUROGENIC CLAUDICATION: Primary | ICD-10-CM

## 2019-08-09 PROCEDURE — 97110 THERAPEUTIC EXERCISES: CPT | Performed by: PHYSICAL THERAPIST

## 2019-08-09 NOTE — THERAPY DISCHARGE NOTE
Outpatient Physical Therapy Ortho Treatment Note/Discharge Summary   Alexander     Patient Name: Frank Gunderson  : 1940  MRN: [id number]  Today's Date: 2019      Visit Date: 2019    Visit Dx:    ICD-10-CM ICD-9-CM   1. Spinal stenosis of lumbar region with neurogenic claudication M48.062 724.03       Patient Active Problem List   Diagnosis   • Lumbar stenosis with neurogenic claudication   • Spinal stenosis of cervical region   • Chronic lumbar radiculopathy   • Chronic cervical radiculopathy   • Peripheral polyneuropathy        Past Medical History:   Diagnosis Date   • Cancer (CMS/[facility])     Prostate   • Heart disease    • Low back pain         Past Surgical History:   Procedure Laterality Date   • ROTATOR CUFF REPAIR Right        PT Ortho     Row Name 19 0900       Neural Tension Signs- Lower Quarter Clearing    Slump  Negative  -CR    SLR  Negative  -CR       SI/Hip Screen- Lower Quarter Clearing    Erlin's/Nicolas's test  Negative  -CR    Posterior thigh sheer  Negative  -CR       Hip Special Tests    ERLIN (hip vs SI pathology)  Negative  -CR      User Key  (r) = Recorded By, (t) = Taken By, (c) = Cosigned By    Initials Name Provider Type    Umer Johnson, PT Physical Therapist                      PT Assessment/Plan     Row Name 19 0941          PT Assessment    Assessment Comments  Client indicates overall improvement in symptoms, however standing 30 minutes to 1 hour is the most consistent provocative pattern for left hip soreness.  Pain is abolished with sitting and is re creatable.  Client demonstrates good understanding of self managment as well as participates in recreational activity routine in community gym.  He is appropriate for transistion to [facility] at this time for ongoing management of symptoms consistent with stenosis.    -CR        PT Plan    PT Plan Comments  Discharge to HEP   -CR       User Key  (r) = Recorded By, (t) = Taken By, (c) = Cosigned By     Initials Name Provider Type    Umer Johnson, PT Physical Therapist              Exercises     Row Name 08/09/19 0900             Subjective Comments    Subjective Comments  Client reports injection was beneficial, improved from what it was, but not complete resolution   -CR         Subjective Pain    Able to rate subjective pain?  yes  -CR      Pre-Treatment Pain Level  0  -CR      Post-Treatment Pain Level  0  -CR         Total Minutes    61930 - PT Therapeutic Exercise Minutes  30  -CR         Exercise 1    Exercise Name 1  nu step  -CR      Time 1  5  -CR         Exercise 2    Exercise Name 2  LTR  -CR      Reps 2  30  -CR         Exercise 3    Exercise Name 3  SKTC  -CR      Reps 3  5  -CR      Time 3  30  -CR         Exercise 4    Exercise Name 4  Seated ball roll outs  -CR      Reps 4  30  -CR         Exercise 5    Exercise Name 5  Bridging  -CR      Reps 5  20  -CR         Exercise 6    Exercise Name 6  piriformis stretch  -CR      Reps 6  5  -CR      Time 6  30  -CR         Exercise 7    Exercise Name 7  leg press   -CR      Sets 7  2  -CR      Reps 7  15  -CR      Additional Comments  140#  -CR        User Key  (r) = Recorded By, (t) = Taken By, (c) = Cosigned By    Initials Name Provider Type    CR Umer Perera, PT Physical Therapist                         PT OP Goals     Row Name 08/09/19 0900          PT Short Term Goals    STG Date to Achieve  08/07/19  -CR     STG 1  client will demonstrates indpendence with initial HEP  -CR     STG 1 Progress  Met  -CR     STG 2  client will report increase in standing tolerance to 1 hour without pain  -CR     STG 2 Progress  Partially Met  -CR        Long Term Goals    LTG Date to Achieve  09/04/19  -CR       User Key  (r) = Recorded By, (t) = Taken By, (c) = Cosigned By    Initials Name Provider Type    Umer Johnson, PT Physical Therapist          Therapy Education  Program: Reinforced    Outcome Measure Options: Modifed  Owestry  Modified Oswestry  Modified Oswestry Score/Comments: 2/50      Time Calculation:   Start Time: 0930  Therapy Charges for Today     Code Description Service Date Service Provider Modifiers Qty    [id number] HC PT THER PROC EA 15 MIN 8/9/2019 Umer Perera, PT GP 2          PT G-Codes  Outcome Measure Options: Modifed Owestry  Modified Oswestry Score/Comments: 2/50     OP PT Discharge Summary  Date of Discharge: 08/09/19  Reason for Discharge: Maximum functional potential achieved  Outcomes Achieved: Patient able to partially acheive established goals  Discharge Destination: Home with home program  Discharge Instructions/Additional Comments: Client to be discharged to [facility] at this time.        Umer Perera, PT  8/9/2019

## 2019-09-05 NOTE — PROGRESS NOTES
"Chief Complaint: \"Pain in my left hip.\"       History of Present Illness:  Mr. Frank Gunderson, 78 y.o. male, originally referred by Dr. Marcell Barrow in consultation for chronic intractable left hip pain.   Pain history: Patient reports a 7-8 year history of pain, which began without incident. Patient was last seen on 07/29/2019, when he underwent diagnostic and therapeutic left L4-L5 and left L5-S1 transforaminal epidural steroid injections, from which he experienced 40% ongoing pain relief and functional improvement.  He continues to struggle with neurogenic claudication.  Pain description: constant pain with intermittent exacerbation, described dull and aching sensation.   Radiation of pain: The lower back pain radiates into the left hip   Pain intensity today: 0/10 (sitting) 5/10 (standing)  Average pain intensity last week: 3/10  Pain intensity ranges from: 0/10 to 5/10  Aggravating factors: Pain increases with standing longer than 15 minutes.  Alleviating factors: Pain decreases with sitting, standing and lying down     Associated symptoms:   Patient denies pain, numbness or weakness in the lower extremities.   Patient denies any new bladder or bowel problems.   Patient denies difficulties with his balance or recent falls     Review of previous therapies and additional medical records:  Frank Gunderson has already failed the following measures,  including:   Conservative measures: oral analgesics, physical therapy, ice and heat   Interventional measures: Patient had 2 \"injections\" at [facility]. As reference above  Surgical measures: No history of lumbar spine or hip surgery  Frank Gunderson underwent neurosurgical consultation with Dr. Barrow on 07/01/2019, and was found to be a potential surgical candidate.  Frank Gunderson is a healthy adult other than his chronic pain and prostate cancer Tx'd with seeding  In terms of current analgesics, Frank Gunderson takes: Nothing  I have reviewed " [facility] Report #[id number] consistent to medication reconciliation.     Global Pain Scale 07-18 2019 09-12 2019               Pain  3 5               Feelings  0  0               Clinical outcomes  0  0               Activities  0  0               GPS Total:  3  5                  Review of Diagnostic Studies:    X-Ray Lumbar Spine on 07/01/2019: minimal retrolisthesis of L2 on L3. Vertebral body heights are preserved. Intervertebral disc height space loss L2-L3 through L5-S1 levels with degenerative disc disease and adjacent facet arthropathy in the posterior elements including osseous neuroforaminal narrowing at these levels greatest at L5-S1. Flexion and extension views demonstrate stability of overall alignment. Lateral oblique views without evidence for pars interarticularis defects however partially obscured due to overlying degenerative changes and facet arthropathy. SI joints symmetric and without widening. No displaced pelvic ring fracture. Femoral heads well located bilaterally with mild to moderate DJD present. No acute osseous findings of the pelvis or proximal femurs.  EMG on 07/01/2019: Moderate chronic left L5-S1 radiculopathy.  Moderate to severe right carpal tunnel syndrome. Moderate chronic left C6-C7 radiculopathy. Mild underlying peripheral polyneuropathy  MRI of the lumbar spine without contrast on March 5, 2019, radiology report: There are endplate changes at several levels. Mild retrolisthesis of L2 on L3.  The conus has an unremarkable appearance.  Axial imaging:   L1-L2: Annular bulge, facet arthropathy, vertebral osteophytes. Mild right and moderate left neuroforaminal stenosis.  Left lateral recess stenosis   L2-L3: Annular bulge, facet arthropathy, vertebral osteophytes. Left foraminal disc protrusion.  Mild right and moderate left neuroforaminal stenosis   L3-L4: Annular bulge, facet arthropathy, vertebral osteophytes. Moderate right and severe left neuroforaminal stenosis.  Mild central  canal stenosis with an AP thecal sac diameter of 8 mm   L4-L5: Annular bulge, facet arthropathy, vertebral osteophytes. Bilateral lateral recess stenosis.  Moderate central canal stenosis with an AP thecal sac diameter of 6 mm.  Severe bilateral neuroforaminal stenosis   L5-S1: Annular bulge, facet arthropathy, vertebral osteophytes. Moderate right and severe left neuroforaminal stenosis.   MRI CERVICAL SPINE WO CONTRAST- 07/01/2019: There is extensive degenerative changes identified of C2/C3 level with mild anterolisthesis of C4 on C5. Degenerative changes as well seen of C5/C6 and C6/C7. Craniovertebral junction is preserved.  Degenerative changes seen within the posterior facets. No abnormal mass or fluid collection seen within the paraspinal muscles. Axial imaging:  C2-C3: severe central spinal canal stenosis with mass effect and signal abnormality seen within the spinal cord. There is increased signal to suggest chronic myelopathy change. There is narrowing of the neuroforamina bilaterally. Degenerative changes within the posterior facets with thickening of the posterior ligamentum flavum.  C3-C4: posterior disc osteophyte complex creating no significant mass effect on the thecal sac. No neuroforaminal stenosis  C4-C5: mild anterolisthesis of C4 on C5. Broad-based disc bulge creating no significant central spinal canal stenosis or nerve root compromise.  C5-C6: small posterior disc osteophyte complex creating some mass effect anteriorly on the leftward aspect of the thecal sac with no significant central spinal canal stenosis or nerve root compromise.  C6-C7: posterior disc osteophyte complex creating mass effect anteriorly on the thecal sac with narrowing of the left neuroforamina. There is mass effect on the nerve roots bilaterally. Moderate central spinal canal stenosis.      Review of Systems   Musculoskeletal: Positive for back pain.   All other systems reviewed and are negative.        Patient Active  "Problem List   Diagnosis   • Lumbar stenosis with neurogenic claudication   • Spinal stenosis of cervical region   • Chronic lumbar radiculopathy   • Chronic cervical radiculopathy   • Peripheral polyneuropathy       Past Medical History:   Diagnosis Date   • Cancer (CMS/HCC)     Prostate   • Heart disease    • Low back pain          Past Surgical History:   Procedure Laterality Date   • ROTATOR CUFF REPAIR Right          Family History   Problem Relation Age of Onset   • Hypertension Father          Social History     Socioeconomic History   • Marital status:      Spouse name: Not on file   • Number of children: Not on file   • Years of education: Not on file   • Highest education level: Not on file   Tobacco Use   • Smoking status: Never Smoker   • Smokeless tobacco: Never Used   Substance and Sexual Activity   • Alcohol use: No     Frequency: Never   • Drug use: No   • Sexual activity: Defer           Current Outpatient Medications:   •  aspirin 81 MG EC tablet, Take 81 mg by mouth Daily., Disp: , Rfl:   •  atorvastatin (LIPITOR) 20 MG tablet, Take 20 mg by mouth Daily., Disp: , Rfl:   •  felodipine (PLENDIL) 10 MG 24 hr tablet, Take 10 mg by mouth Daily., Disp: , Rfl:   •  Multiple Vitamins-Minerals (MULTIVITAMIN WITH MINERALS) tablet tablet, Take 1 tablet by mouth Daily., Disp: , Rfl:   •  tamsulosin (FLOMAX) 0.4 MG capsule 24 hr capsule, Take 1 capsule by mouth Every Night., Disp: , Rfl:       No Known Allergies      /90 (BP Location: Left arm, Patient Position: Sitting)   Temp 98 °F (36.7 °C) (Temporal)   Ht 188 cm (74\")   Wt 88.9 kg (196 lb)   BMI 25.16 kg/m²       Physical Exam:  Constitutional: Patient is oriented to person, place, and time. Patient appears well-developed and well-nourished.   HEENT: Head: Normocephalic and atraumatic. Eyes: Conjunctivae and lids are normal. Pupils: Equal, round, reactive to light.   Neck: Trachea normal. Neck supple. No JVD present.   Lymphatic: No " cervical adenopathy  Pulmonary Respiratory effort: No increased work of breathing or signs of respiratory distress. Auscultation of lungs: Clear to auscultation.   Cardiovascular Auscultation of heart: Normal rate and rhythm, normal S1 and S2, no murmurs.   Peripheral vascular exam: Femoral: right 2+, left 2+. Posterior tibialis: right 2+ and left 2+. Dorsalis pedis: right 2+ and left 2+. No edema.   Musculoskeletal   Gait and station: Gait evaluation demonstrated a normal gait   Cervical spine: Passive and active range of motion are limited secondary to pain. Extension and rotation of the cervical spine increased and reproduced pain. Cervical facet joint loading maneuvers are positive.  Muscles: Presence of active trigger points; none  Shoulders: The range of motion of the glenohumeral joints is full and without pain. Rotator cuff strength is 5/5.   Lumbar spine: Passive and active range of motion are appropriate for his age and condition. Extension, flexion, lateral flexion, rotation of the lumbar spine did not increase or reproduce pain. Lumbar facet joint loading maneuvers are negative.   Nicolas test and Gaenslen's test are negative   Piriformis maneuvers are negative   Palpation of the bilateral ischial tuberosities, unrevealing   Palpation of the bilateral greater trochanters, unrevealing   Examination of the Iliotibial band: unrevealing   Hip joints: The range of motion of the hip joints is almost full and without pain   Neurological: Patient is alert and oriented to person, place, and time. Speech: speech is normal. Cortical function: Normal mental status.   Cranial nerves: Cranial nerves 2-12 intact.   Reflex Scores:  Right brachioradialis: 3+  Left brachioradialis: 3+  Right biceps: 3+  Left biceps: 3+  Right triceps: 3+  Left triceps: 3+  Right patellar: 3+  Left patellar: 3+  Right Achilles: 2+  Left Achilles: 2+  Motor strength: 5/5  Motor Tone: normal tone.   Involuntary movements: none.    Superficial/Primitive Reflexes: primitive reflexes were absent.   Right Pike: absent  Left Pike: absent  Right ankle clonus: absent  Left ankle clonus: absent   Spurling sign is negative. Neck tornado test is negative. Lhermitte sign is negative. Negative long tract signs. Straight leg raising test is negative. Femoral stretch sign is negative.   Sensation: No sensory loss. Sensory exam: intact to light touch, intact pain and temperature sensation, intact vibration sensation and normal proprioception.   Coordination: Normal finger to nose and heel to shin. Normal balance and negative Romberg's sign   Skin and subcutaneous tissue: Skin is warm and intact. No rash noted. No cyanosis.   Psychiatric: Judgment and insight: Normal. Orientation to person, place and time: Normal. Recent and remote memory: Intact. Mood and affect: Normal.     ASSESSMENT:   1. Lumbar stenosis with neurogenic claudication    2. Chronic lumbar radiculopathy    3. Peripheral polyneuropathy    4. Spinal stenosis of cervical region    5. Chronic cervical radiculopathy        PLAN/MEDICAL DECISION MAKING: I had a lengthy conversation with . Frank Gunderson regarding his chronic pain condition and potential therapeutic options including risks, benefits, alternative therapies, to name a few. Patient has a history of chronic lower back pain that radiates down into his left hip. Patient reports symptoms of neurogenic claudication. EMG/NCV on 07/01/2019 revealed moderate chronic left L5-S1 radiculopathy. MRI of the lumbar spine without contrast on March 5, 2019, revealed diffuse degenerative changes and multilevel stenosis of various degrees, most significant at L3-L4: Annular bulge, facet arthropathy, vertebral osteophytes. Moderate right and severe left neuroforaminal stenosis. L4-L5: Annular bulge, facet arthropathy, vertebral osteophytes. Bilateral lateral recess stenosis. Moderate central canal stenosis with an AP thecal sac diameter of  6 mm. Severe bilateral neuroforaminal stenosis. L5-S1: Annular bulge, facet arthropathy, vertebral osteophytes. Moderate right and severe left neuroforaminal stenosis. Flexion-extension lumbar spine, stable spine. MRI of the cervical spine shows severe stenosis at C2-C3 with minimal amount of myelomalacia. Patient has previously failed to obtain pain relief with conservative measures, as referenced above. Patient experienced 30-40% ongoing pain relief from his diagnostic and therapeutic left L4-L5 and left L5-S1 transforaminal epidural steroid injections, as referenced above. I have reviewed all available patient's medical records as well as previous therapies as referenced above. Therefore, I have proposed the following plan:  1. Pharmacological measures: Reviewed. Discussed. Patient has declined pharmacological measures   2. Interventional pain management measures: None indicated at this time. Patient will follow-up with Dr. Barrow, as previously planned. He is a p\otential candidate for a lumbar laminectomy at L4-L5 with undercutting L3-L4, as per Dr. Barrow. If he is found not to be a surgical candidate, then, a SCS trial could be an option. For his neck issues, Dr. Barrow has discussed the possibility of C2-C3 laminectomy.    3. Long-term rehabilitation efforts:  A. The patient does not have a history of falls. I did complete a risk assessment for falls  B. Patient has completed a comprehensive physical therapy program   C. Start an exercise program such as yoga, Pilates, water therapy and swimming  D. Contrast therapy: Apply ice-packs for 15-20 minutes, followed by heating pads for 15-20 minutes to affected area   4. The patient has been instructed to contact my office with any questions or difficulties. The patient understands the plan and agrees to proceed accordingly.       Patient Care Team:  Norman Gomez DO as PCP - General (Internal Medicine)  Marcell Barrow MD as Consulting Physician  (Neurosurgery)     No orders of the defined types were placed in this encounter.        Future Appointments   Date Time Provider Department Center   1/6/2020  1:00 PM Jake Camarena PA-C [facility] None         MD ARABELLA Leon Dragon/Transcription disclaimer:  Much of this encounter note is an electronic transcription of spoken language to printed text. Electronic transcription of spoken language may permit erroneous, or at times, nonsensical words or phrases to be inadvertently transcribed. Although I have reviewed the note for such errors, some may still exist.

## 2019-09-12 ENCOUNTER — OFFICE VISIT (OUTPATIENT)
Dept: PAIN MEDICINE | Facility: CLINIC | Age: 79
End: 2019-09-12

## 2019-09-12 VITALS
WEIGHT: 196 LBS | SYSTOLIC BLOOD PRESSURE: 152 MMHG | BODY MASS INDEX: 25.15 KG/M2 | HEIGHT: 74 IN | DIASTOLIC BLOOD PRESSURE: 90 MMHG | TEMPERATURE: 98 F

## 2019-09-12 DIAGNOSIS — M54.16 CHRONIC LUMBAR RADICULOPATHY: ICD-10-CM

## 2019-09-12 DIAGNOSIS — M54.12 CHRONIC CERVICAL RADICULOPATHY: ICD-10-CM

## 2019-09-12 DIAGNOSIS — G62.9 PERIPHERAL POLYNEUROPATHY: ICD-10-CM

## 2019-09-12 DIAGNOSIS — M48.02 SPINAL STENOSIS OF CERVICAL REGION: ICD-10-CM

## 2019-09-12 DIAGNOSIS — M48.062 LUMBAR STENOSIS WITH NEUROGENIC CLAUDICATION: ICD-10-CM

## 2019-09-12 PROCEDURE — 99213 OFFICE O/P EST LOW 20 MIN: CPT | Performed by: ANESTHESIOLOGY

## 2019-09-12 RX ORDER — MULTIPLE VITAMINS W/ MINERALS TAB 9MG-400MCG
1 TAB ORAL DAILY
COMMUNITY

## 2019-12-27 ENCOUNTER — DOCUMENTATION (OUTPATIENT)
Dept: NEUROSURGERY | Facility: CLINIC | Age: 79
End: 2019-12-27

## 2020-01-13 ENCOUNTER — OFFICE VISIT (OUTPATIENT)
Dept: NEUROSURGERY | Facility: CLINIC | Age: 80
End: 2020-01-13

## 2020-01-13 ENCOUNTER — HOSPITAL ENCOUNTER (OUTPATIENT)
Dept: GENERAL RADIOLOGY | Facility: HOSPITAL | Age: 80
Discharge: HOME | End: 2020-01-13
Admitting: PHYSICIAN ASSISTANT

## 2020-01-13 VITALS
SYSTOLIC BLOOD PRESSURE: 156 MMHG | BODY MASS INDEX: 25.69 KG/M2 | WEIGHT: 200.2 LBS | HEIGHT: 74 IN | DIASTOLIC BLOOD PRESSURE: 84 MMHG | TEMPERATURE: 97.9 F

## 2020-01-13 DIAGNOSIS — M48.02 SPINAL STENOSIS OF CERVICAL REGION: ICD-10-CM

## 2020-01-13 DIAGNOSIS — M54.12 CHRONIC CERVICAL RADICULOPATHY: ICD-10-CM

## 2020-01-13 DIAGNOSIS — M54.12 CHRONIC CERVICAL RADICULOPATHY: Primary | ICD-10-CM

## 2020-01-13 PROCEDURE — 72052 X-RAY EXAM NECK SPINE 6/>VWS: CPT

## 2020-01-13 PROCEDURE — 99214 OFFICE O/P EST MOD 30 MIN: CPT | Performed by: PHYSICIAN ASSISTANT

## 2020-01-13 NOTE — PROGRESS NOTES
Subjective     Chief Complaint:  Frank Gunderson is a 79 y.o. male is here today for follow-up.of his back pain and left leg pain. He also has known cervical stenosis.     History of Present Illness    Mr Gunderson is an 80 yo M who was seen by Dr. Barrow in July of 2019 with complaints of back pain radiating into his left hip with walking and standing intolerance.  MRI showed lumbar stenosis at L4/5 with some at the L3/4 level as well and surgery was discussed, however, Dr. Barrow noted hyperreflexia on exam and ordered a cervical MRI to be performed.  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  The patient was very eager to avoid surgery and was not having any gait disturbance, weakness, or bowel/bladder dysfunction. Dr. Barrow recommended a 6 month follow up  Today, the patient notes a worsening of his lumbar pain. He has had a steroid injection and chiropractic care since last seen here and they are not helping his left leg pain. He had talked about a lumbar laminectomy with Dr. Barrow and would like to pursue this for relief of his symptoms.    CT of his cervical spine was reviewed today. It had showed a severe C3 stenosis and the risk of lumbar surgery without addressing this issue was discussed with the patient. Dr. Barrow requested cervical xrays with flexion/extension views.     The following portions of the patient's history were reviewed and updated as appropriate: allergies, current medications, past family history, past medical history, past social history, past surgical history and problem list.    Past Medical History:   Diagnosis Date   • Cancer (CMS/HCC)     Prostate   • Heart disease    • Low back pain      Past Surgical History:   Procedure Laterality Date   • ROTATOR CUFF REPAIR Right        Family history:   Family History   Problem Relation Age of Onset   • Hypertension Father        Social history:   Social History     Socioeconomic History   • Marital status:       Spouse name: Not on file   • Number of children: Not on file   • Years of education: Not on file   • Highest education level: Not on file   Tobacco Use   • Smoking status: Never Smoker   • Smokeless tobacco: Never Used   Substance and Sexual Activity   • Alcohol use: No     Frequency: Never   • Drug use: No   • Sexual activity: Defer       Review of Systems   Constitutional: Negative for activity change, appetite change, chills, diaphoresis, fatigue, fever and unexpected weight change.   HENT: Negative for congestion, dental problem, drooling, ear discharge, ear pain, facial swelling, hearing loss, mouth sores, nosebleeds, postnasal drip, rhinorrhea, sinus pressure, sneezing, sore throat, tinnitus, trouble swallowing and voice change.    Eyes: Negative for photophobia, pain, discharge, redness, itching and visual disturbance.   Respiratory: Negative for apnea, cough, choking, chest tightness, shortness of breath, wheezing and stridor.    Cardiovascular: Negative for chest pain, palpitations and leg swelling.   Gastrointestinal: Negative for abdominal distention, abdominal pain, anal bleeding, blood in stool, constipation, diarrhea, nausea, rectal pain and vomiting.   Endocrine: Negative for cold intolerance, heat intolerance, polydipsia, polyphagia and polyuria.   Genitourinary: Negative for decreased urine volume, difficulty urinating, dysuria, enuresis, flank pain, frequency, genital sores, hematuria and urgency.   Musculoskeletal: Positive for back pain and neck stiffness. Negative for arthralgias, gait problem, joint swelling, myalgias and neck pain.   Skin: Negative for color change, pallor, rash and wound.   Allergic/Immunologic: Negative for environmental allergies, food allergies and immunocompromised state.   Neurological: Negative for dizziness, tremors, seizures, syncope, facial asymmetry, speech difficulty, weakness, light-headedness, numbness and headaches.   Hematological: Negative for  "adenopathy. Does not bruise/bleed easily.   Psychiatric/Behavioral: Negative for agitation, behavioral problems, confusion, decreased concentration, dysphoric mood, hallucinations, self-injury, sleep disturbance and suicidal ideas. The patient is not nervous/anxious and is not hyperactive.    All other systems reviewed and are negative.      Objective   Blood pressure 156/84, temperature 97.9 °F (36.6 °C), height 188 cm (74\"), weight 90.8 kg (200 lb 3.2 oz).  Body mass index is 25.7 kg/m².    Physical Exam   Constitutional: He is oriented to person, place, and time. He appears well-developed and well-nourished. No distress.   HENT:   Head: Normocephalic and atraumatic.   Eyes: Pupils are equal, round, and reactive to light. EOM are normal.   Neck: Normal range of motion. Neck supple.   Pulmonary/Chest: Effort normal. No respiratory distress.   Musculoskeletal:   Gait is steady and independent but wide based. Upper and lower extremity strength intact.    Neurological: He is alert and oriented to person, place, and time.   Reflex Scores:       Tricep reflexes are 3+ on the right side and 3+ on the left side.       Bicep reflexes are 3+ on the right side and 3+ on the left side.       Patellar reflexes are 3+ on the right side and 3+ on the left side.       Achilles reflexes are 2+ on the right side and 2+ on the left side.  Vibratory sensation diminished, but achilles reflex is present. No clonus. Possible mild Pike's on the left hand.    Skin: Skin is warm and dry.   Psychiatric: He has a normal mood and affect.         Assessment/Plan     Independent Review of Radiographic Studies:    Severe cervical stenosis at C3 / lower end of C2.   Flexion/extension xray of the cervical spine demonstrates anterolisthesis of C4 on C5 without instability      Medical Decision Making:    The scans were reviewed by Dr. Barrow and he spent time discussing the results with the patient. He recommended a C3 laminectomy with " undercutting at C2 to relieve his cervical compression before addressing his lumbar discomfort due to the degree of stenosis that he has Dr. Barrow explained the risks, benefits, and possible complications of surgery and the patient's questions were answered. We will schedule a C3 laminectomy for the near future.     Frank was seen today for neck & back pain and back pain.    Diagnoses and all orders for this visit:    Chronic cervical radiculopathy  -     XR spine cervical complete w flex ext; Future  -     Case Request; Standing  -     CBC (No Diff); Future  -     Basic Metabolic Panel; Future  -     ECG 12 Lead; Future  -     Case Request    Spinal stenosis of cervical region  -     XR spine cervical complete w flex ext; Future  -     Case Request; Standing  -     CBC (No Diff); Future  -     Basic Metabolic Panel; Future  -     ECG 12 Lead; Future  -     Case Request    Other orders  -     Obtain Informed Consent; Future  -     Follow Anesthesia Guidelines / Standing Orders; Future  -     Provide NPO Instructions to Patient; Future  -     Chlorhexidine Skin Prep; Future        Return in about 1 week (around 1/20/2020).    Loyda Coleman PA-C

## 2020-01-27 ENCOUNTER — APPOINTMENT (OUTPATIENT)
Dept: PREADMISSION TESTING | Facility: HOSPITAL | Age: 80
End: 2020-01-27

## 2020-01-27 VITALS — BODY MASS INDEX: 25.58 KG/M2 | WEIGHT: 199.3 LBS | HEIGHT: 74 IN

## 2020-01-27 DIAGNOSIS — M48.02 SPINAL STENOSIS OF CERVICAL REGION: ICD-10-CM

## 2020-01-27 DIAGNOSIS — M54.12 CHRONIC CERVICAL RADICULOPATHY: ICD-10-CM

## 2020-01-27 LAB
ANION GAP SERPL CALCULATED.3IONS-SCNC: 14 MMOL/L (ref 5–15)
BUN BLD-MCNC: 30 MG/DL (ref 8–23)
BUN/CREAT SERPL: 12 (ref 7–25)
CALCIUM SPEC-SCNC: 9.5 MG/DL (ref 8.6–10.5)
CHLORIDE SERPL-SCNC: 105 MMOL/L (ref 98–107)
CO2 SERPL-SCNC: 24 MMOL/L (ref 22–29)
CREAT BLD-MCNC: 2.5 MG/DL (ref 0.76–1.27)
DEPRECATED RDW RBC AUTO: 44.1 FL (ref 37–54)
ERYTHROCYTE [DISTWIDTH] IN BLOOD BY AUTOMATED COUNT: 12.1 % (ref 12.3–15.4)
GFR SERPL CREATININE-BSD FRML MDRD: 25 ML/MIN/1.73
GLUCOSE BLD-MCNC: 67 MG/DL (ref 65–99)
HBA1C MFR BLD: 5.8 % (ref 4.8–5.6)
HCT VFR BLD AUTO: 43 % (ref 37.5–51)
HGB BLD-MCNC: 14.2 G/DL (ref 13–17.7)
MCH RBC QN AUTO: 32.8 PG (ref 26.6–33)
MCHC RBC AUTO-ENTMCNC: 33 G/DL (ref 31.5–35.7)
MCV RBC AUTO: 99.3 FL (ref 79–97)
PLATELET # BLD AUTO: 170 10*3/MM3 (ref 140–450)
PMV BLD AUTO: 9.6 FL (ref 6–12)
POTASSIUM BLD-SCNC: 4.6 MMOL/L (ref 3.5–5.2)
RBC # BLD AUTO: 4.33 10*6/MM3 (ref 4.14–5.8)
SODIUM BLD-SCNC: 143 MMOL/L (ref 136–145)
WBC NRBC COR # BLD: 6.17 10*3/MM3 (ref 3.4–10.8)

## 2020-01-27 PROCEDURE — 83036 HEMOGLOBIN GLYCOSYLATED A1C: CPT | Performed by: ANESTHESIOLOGY

## 2020-01-27 PROCEDURE — 36415 COLL VENOUS BLD VENIPUNCTURE: CPT

## 2020-01-27 PROCEDURE — 85027 COMPLETE CBC AUTOMATED: CPT | Performed by: PHYSICIAN ASSISTANT

## 2020-01-27 PROCEDURE — 80048 BASIC METABOLIC PNL TOTAL CA: CPT | Performed by: PHYSICIAN ASSISTANT

## 2020-01-27 PROCEDURE — 93005 ELECTROCARDIOGRAM TRACING: CPT

## 2020-01-27 PROCEDURE — 93010 ELECTROCARDIOGRAM REPORT: CPT | Performed by: INTERNAL MEDICINE

## 2020-01-27 PROCEDURE — 87081 CULTURE SCREEN ONLY: CPT | Performed by: ANESTHESIOLOGY

## 2020-01-27 RX ORDER — OLOPATADINE HYDROCHLORIDE 665 UG/1
2 SPRAY NASAL DAILY
COMMUNITY

## 2020-01-28 LAB — MRSA SPEC QL CULT: NORMAL

## 2020-02-05 ENCOUNTER — ANESTHESIA EVENT (OUTPATIENT)
Dept: PERIOP | Facility: HOSPITAL | Age: 80
End: 2020-02-05

## 2020-02-05 ENCOUNTER — APPOINTMENT (OUTPATIENT)
Dept: GENERAL RADIOLOGY | Facility: HOSPITAL | Age: 80
End: 2020-02-05

## 2020-02-05 ENCOUNTER — ANESTHESIA (OUTPATIENT)
Dept: PERIOP | Facility: HOSPITAL | Age: 80
End: 2020-02-05

## 2020-02-05 ENCOUNTER — HOSPITAL ENCOUNTER (OUTPATIENT)
Facility: HOSPITAL | Age: 80
LOS: 1 days | Discharge: HOME | End: 2020-02-06
Attending: NEUROLOGICAL SURGERY | Admitting: NEUROLOGICAL SURGERY

## 2020-02-05 DIAGNOSIS — M48.02 SPINAL STENOSIS OF CERVICAL REGION: ICD-10-CM

## 2020-02-05 DIAGNOSIS — M54.12 CHRONIC CERVICAL RADICULOPATHY: ICD-10-CM

## 2020-02-05 PROBLEM — G95.9 CERVICAL MYELOPATHY (HCC): Status: ACTIVE | Noted: 2020-02-05

## 2020-02-05 PROCEDURE — 25010000002 FENTANYL CITRATE (PF) 100 MCG/2ML SOLUTION: Performed by: NURSE ANESTHETIST, CERTIFIED REGISTERED

## 2020-02-05 PROCEDURE — 25010000002 PROPOFOL 10 MG/ML EMULSION: Performed by: NURSE ANESTHETIST, CERTIFIED REGISTERED

## 2020-02-05 PROCEDURE — 25010000002 ONDANSETRON PER 1 MG: Performed by: NURSE ANESTHETIST, CERTIFIED REGISTERED

## 2020-02-05 PROCEDURE — 25010000002 DEXAMETHASONE PER 1 MG: Performed by: NURSE ANESTHETIST, CERTIFIED REGISTERED

## 2020-02-05 PROCEDURE — 25010000002 CEFAZOLIN PER 500 MG: Performed by: NEUROLOGICAL SURGERY

## 2020-02-05 PROCEDURE — 76000 FLUOROSCOPY <1 HR PHYS/QHP: CPT

## 2020-02-05 PROCEDURE — 25010000002 PHENYLEPHRINE PER 1 ML: Performed by: NURSE ANESTHETIST, CERTIFIED REGISTERED

## 2020-02-05 PROCEDURE — 25010000003 LIDOCAINE 1 % SOLUTION: Performed by: NURSE ANESTHETIST, CERTIFIED REGISTERED

## 2020-02-05 PROCEDURE — C1713 ANCHOR/SCREW BN/BN,TIS/BN: HCPCS | Performed by: NEUROLOGICAL SURGERY

## 2020-02-05 PROCEDURE — 25010000002 NEOSTIGMINE 10 MG/10ML SOLUTION: Performed by: NURSE ANESTHETIST, CERTIFIED REGISTERED

## 2020-02-05 PROCEDURE — 25010000003 CEFAZOLIN IN DEXTROSE 2-4 GM/100ML-% SOLUTION: Performed by: PHYSICIAN ASSISTANT

## 2020-02-05 PROCEDURE — 63045 LAM FACETEC & FORAMOT CRV: CPT | Performed by: NEUROLOGICAL SURGERY

## 2020-02-05 RX ORDER — ATROPINE SULFATE 1 MG/ML
0.5 INJECTION, SOLUTION INTRAMUSCULAR; INTRAVENOUS; SUBCUTANEOUS ONCE AS NEEDED
Status: DISCONTINUED | OUTPATIENT
Start: 2020-02-05 | End: 2020-02-05 | Stop reason: HOSPADM

## 2020-02-05 RX ORDER — ONDANSETRON 2 MG/ML
INJECTION INTRAMUSCULAR; INTRAVENOUS AS NEEDED
Status: DISCONTINUED | OUTPATIENT
Start: 2020-02-05 | End: 2020-02-05 | Stop reason: SURG

## 2020-02-05 RX ORDER — LIDOCAINE HYDROCHLORIDE AND EPINEPHRINE 5; 5 MG/ML; UG/ML
INJECTION, SOLUTION INFILTRATION; PERINEURAL AS NEEDED
Status: DISCONTINUED | OUTPATIENT
Start: 2020-02-05 | End: 2020-02-05 | Stop reason: HOSPADM

## 2020-02-05 RX ORDER — ONDANSETRON 2 MG/ML
4 INJECTION INTRAMUSCULAR; INTRAVENOUS ONCE AS NEEDED
Status: DISCONTINUED | OUTPATIENT
Start: 2020-02-05 | End: 2020-02-05 | Stop reason: HOSPADM

## 2020-02-05 RX ORDER — BISACODYL 5 MG/1
5 TABLET, DELAYED RELEASE ORAL DAILY
Status: DISCONTINUED | OUTPATIENT
Start: 2020-02-05 | End: 2020-02-06 | Stop reason: HOSPADM

## 2020-02-05 RX ORDER — DOCUSATE SODIUM 100 MG/1
100 CAPSULE, LIQUID FILLED ORAL 2 TIMES DAILY PRN
Status: DISCONTINUED | OUTPATIENT
Start: 2020-02-05 | End: 2020-02-06 | Stop reason: HOSPADM

## 2020-02-05 RX ORDER — NEOSTIGMINE METHYLSULFATE 1 MG/ML
INJECTION, SOLUTION INTRAVENOUS AS NEEDED
Status: DISCONTINUED | OUTPATIENT
Start: 2020-02-05 | End: 2020-02-05 | Stop reason: SURG

## 2020-02-05 RX ORDER — BUPIVACAINE HYDROCHLORIDE 2.5 MG/ML
INJECTION, SOLUTION EPIDURAL; INFILTRATION; INTRACAUDAL AS NEEDED
Status: DISCONTINUED | OUTPATIENT
Start: 2020-02-05 | End: 2020-02-05 | Stop reason: HOSPADM

## 2020-02-05 RX ORDER — LIDOCAINE HYDROCHLORIDE 10 MG/ML
INJECTION, SOLUTION INFILTRATION; PERINEURAL AS NEEDED
Status: DISCONTINUED | OUTPATIENT
Start: 2020-02-05 | End: 2020-02-05 | Stop reason: SURG

## 2020-02-05 RX ORDER — BISACODYL 10 MG
10 SUPPOSITORY, RECTAL RECTAL DAILY PRN
Status: DISCONTINUED | OUTPATIENT
Start: 2020-02-05 | End: 2020-02-06 | Stop reason: HOSPADM

## 2020-02-05 RX ORDER — FENTANYL CITRATE 50 UG/ML
INJECTION, SOLUTION INTRAMUSCULAR; INTRAVENOUS AS NEEDED
Status: DISCONTINUED | OUTPATIENT
Start: 2020-02-05 | End: 2020-02-05 | Stop reason: SURG

## 2020-02-05 RX ORDER — ONDANSETRON 4 MG/1
4 TABLET, FILM COATED ORAL EVERY 6 HOURS PRN
Status: DISCONTINUED | OUTPATIENT
Start: 2020-02-05 | End: 2020-02-06 | Stop reason: HOSPADM

## 2020-02-05 RX ORDER — OXYCODONE AND ACETAMINOPHEN 7.5; 325 MG/1; MG/1
1 TABLET ORAL EVERY 4 HOURS PRN
Status: DISCONTINUED | OUTPATIENT
Start: 2020-02-05 | End: 2020-02-06 | Stop reason: HOSPADM

## 2020-02-05 RX ORDER — SODIUM CHLORIDE 0.9 % (FLUSH) 0.9 %
3 SYRINGE (ML) INJECTION EVERY 12 HOURS SCHEDULED
Status: DISCONTINUED | OUTPATIENT
Start: 2020-02-05 | End: 2020-02-06 | Stop reason: HOSPADM

## 2020-02-05 RX ORDER — AZELASTINE 1 MG/ML
2 SPRAY, METERED NASAL 2 TIMES DAILY
Status: DISCONTINUED | OUTPATIENT
Start: 2020-02-05 | End: 2020-02-06 | Stop reason: HOSPADM

## 2020-02-05 RX ORDER — DIPHENHYDRAMINE HCL 25 MG
25 CAPSULE ORAL NIGHTLY PRN
Status: DISCONTINUED | OUTPATIENT
Start: 2020-02-05 | End: 2020-02-06 | Stop reason: HOSPADM

## 2020-02-05 RX ORDER — ROCURONIUM BROMIDE 10 MG/ML
INJECTION, SOLUTION INTRAVENOUS AS NEEDED
Status: DISCONTINUED | OUTPATIENT
Start: 2020-02-05 | End: 2020-02-05 | Stop reason: SURG

## 2020-02-05 RX ORDER — CEFAZOLIN SODIUM 2 G/100ML
2 INJECTION, SOLUTION INTRAVENOUS ONCE
Status: COMPLETED | OUTPATIENT
Start: 2020-02-05 | End: 2020-02-05

## 2020-02-05 RX ORDER — FAMOTIDINE 20 MG/1
20 TABLET, FILM COATED ORAL
Status: DISCONTINUED | OUTPATIENT
Start: 2020-02-05 | End: 2020-02-05 | Stop reason: HOSPADM

## 2020-02-05 RX ORDER — FENTANYL CITRATE 50 UG/ML
50 INJECTION, SOLUTION INTRAMUSCULAR; INTRAVENOUS
Status: DISCONTINUED | OUTPATIENT
Start: 2020-02-05 | End: 2020-02-05 | Stop reason: HOSPADM

## 2020-02-05 RX ORDER — TAMSULOSIN HYDROCHLORIDE 0.4 MG/1
0.4 CAPSULE ORAL NIGHTLY
Status: DISCONTINUED | OUTPATIENT
Start: 2020-02-05 | End: 2020-02-06 | Stop reason: HOSPADM

## 2020-02-05 RX ORDER — SODIUM CHLORIDE 0.9 % (FLUSH) 0.9 %
10 SYRINGE (ML) INJECTION AS NEEDED
Status: DISCONTINUED | OUTPATIENT
Start: 2020-02-05 | End: 2020-02-06 | Stop reason: HOSPADM

## 2020-02-05 RX ORDER — GLYCOPYRROLATE 0.2 MG/ML
INJECTION INTRAMUSCULAR; INTRAVENOUS AS NEEDED
Status: DISCONTINUED | OUTPATIENT
Start: 2020-02-05 | End: 2020-02-05 | Stop reason: SURG

## 2020-02-05 RX ORDER — LIDOCAINE HYDROCHLORIDE 10 MG/ML
0.5 INJECTION, SOLUTION EPIDURAL; INFILTRATION; INTRACAUDAL; PERINEURAL ONCE AS NEEDED
Status: COMPLETED | OUTPATIENT
Start: 2020-02-05 | End: 2020-02-05

## 2020-02-05 RX ORDER — ATORVASTATIN CALCIUM 20 MG/1
20 TABLET, FILM COATED ORAL NIGHTLY
Status: DISCONTINUED | OUTPATIENT
Start: 2020-02-05 | End: 2020-02-06 | Stop reason: HOSPADM

## 2020-02-05 RX ORDER — AMOXICILLIN 250 MG
1 CAPSULE ORAL NIGHTLY
Status: DISCONTINUED | OUTPATIENT
Start: 2020-02-05 | End: 2020-02-06 | Stop reason: HOSPADM

## 2020-02-05 RX ORDER — CEFAZOLIN SODIUM 2 G/100ML
2 INJECTION, SOLUTION INTRAVENOUS EVERY 8 HOURS
Status: COMPLETED | OUTPATIENT
Start: 2020-02-05 | End: 2020-02-06

## 2020-02-05 RX ORDER — LATANOPROST 50 UG/ML
1 SOLUTION/ DROPS OPHTHALMIC NIGHTLY
Status: DISCONTINUED | OUTPATIENT
Start: 2020-02-05 | End: 2020-02-06 | Stop reason: HOSPADM

## 2020-02-05 RX ORDER — PROPOFOL 10 MG/ML
VIAL (ML) INTRAVENOUS AS NEEDED
Status: DISCONTINUED | OUTPATIENT
Start: 2020-02-05 | End: 2020-02-05 | Stop reason: SURG

## 2020-02-05 RX ORDER — ESMOLOL HYDROCHLORIDE 10 MG/ML
INJECTION INTRAVENOUS AS NEEDED
Status: DISCONTINUED | OUTPATIENT
Start: 2020-02-05 | End: 2020-02-05 | Stop reason: SURG

## 2020-02-05 RX ORDER — SODIUM CHLORIDE 9 MG/ML
INJECTION, SOLUTION INTRAVENOUS CONTINUOUS PRN
Status: DISCONTINUED | OUTPATIENT
Start: 2020-02-05 | End: 2020-02-05 | Stop reason: SURG

## 2020-02-05 RX ORDER — ONDANSETRON 2 MG/ML
4 INJECTION INTRAMUSCULAR; INTRAVENOUS EVERY 6 HOURS PRN
Status: DISCONTINUED | OUTPATIENT
Start: 2020-02-05 | End: 2020-02-06 | Stop reason: HOSPADM

## 2020-02-05 RX ORDER — FELODIPINE 5 MG/1
10 TABLET, EXTENDED RELEASE ORAL DAILY
Status: DISCONTINUED | OUTPATIENT
Start: 2020-02-06 | End: 2020-02-06 | Stop reason: HOSPADM

## 2020-02-05 RX ORDER — ACETAMINOPHEN 325 MG/1
650 TABLET ORAL EVERY 4 HOURS PRN
Status: DISCONTINUED | OUTPATIENT
Start: 2020-02-05 | End: 2020-02-06 | Stop reason: HOSPADM

## 2020-02-05 RX ORDER — DEXAMETHASONE SODIUM PHOSPHATE 4 MG/ML
INJECTION, SOLUTION INTRA-ARTICULAR; INTRALESIONAL; INTRAMUSCULAR; INTRAVENOUS; SOFT TISSUE AS NEEDED
Status: DISCONTINUED | OUTPATIENT
Start: 2020-02-05 | End: 2020-02-05 | Stop reason: SURG

## 2020-02-05 RX ORDER — EPHEDRINE SULFATE 50 MG/ML
5 INJECTION, SOLUTION INTRAVENOUS ONCE AS NEEDED
Status: DISCONTINUED | OUTPATIENT
Start: 2020-02-05 | End: 2020-02-05 | Stop reason: HOSPADM

## 2020-02-05 RX ORDER — SODIUM CHLORIDE, SODIUM LACTATE, POTASSIUM CHLORIDE, CALCIUM CHLORIDE 600; 310; 30; 20 MG/100ML; MG/100ML; MG/100ML; MG/100ML
90 INJECTION, SOLUTION INTRAVENOUS CONTINUOUS
Status: DISCONTINUED | OUTPATIENT
Start: 2020-02-05 | End: 2020-02-06 | Stop reason: HOSPADM

## 2020-02-05 RX ORDER — METHOCARBAMOL 750 MG/1
750 TABLET, FILM COATED ORAL EVERY 8 HOURS SCHEDULED
Status: DISCONTINUED | OUTPATIENT
Start: 2020-02-05 | End: 2020-02-06 | Stop reason: HOSPADM

## 2020-02-05 RX ORDER — SODIUM CHLORIDE 0.9 % (FLUSH) 0.9 %
10 SYRINGE (ML) INJECTION EVERY 12 HOURS SCHEDULED
Status: DISCONTINUED | OUTPATIENT
Start: 2020-02-05 | End: 2020-02-05 | Stop reason: HOSPADM

## 2020-02-05 RX ORDER — MAGNESIUM HYDROXIDE 1200 MG/15ML
LIQUID ORAL AS NEEDED
Status: DISCONTINUED | OUTPATIENT
Start: 2020-02-05 | End: 2020-02-05 | Stop reason: HOSPADM

## 2020-02-05 RX ORDER — SODIUM CHLORIDE 0.9 % (FLUSH) 0.9 %
10 SYRINGE (ML) INJECTION AS NEEDED
Status: DISCONTINUED | OUTPATIENT
Start: 2020-02-05 | End: 2020-02-05 | Stop reason: HOSPADM

## 2020-02-05 RX ORDER — SODIUM CHLORIDE, SODIUM LACTATE, POTASSIUM CHLORIDE, CALCIUM CHLORIDE 600; 310; 30; 20 MG/100ML; MG/100ML; MG/100ML; MG/100ML
9 INJECTION, SOLUTION INTRAVENOUS CONTINUOUS PRN
Status: DISCONTINUED | OUTPATIENT
Start: 2020-02-05 | End: 2020-02-05 | Stop reason: HOSPADM

## 2020-02-05 RX ADMIN — EPHEDRINE SULFATE 10 MG: 50 INJECTION INTRAMUSCULAR; INTRAVENOUS; SUBCUTANEOUS at 14:28

## 2020-02-05 RX ADMIN — SODIUM CHLORIDE: 9 INJECTION, SOLUTION INTRAVENOUS at 14:06

## 2020-02-05 RX ADMIN — DEXAMETHASONE SODIUM PHOSPHATE 8 MG: 4 INJECTION, SOLUTION INTRAMUSCULAR; INTRAVENOUS at 14:06

## 2020-02-05 RX ADMIN — PHENYLEPHRINE HYDROCHLORIDE 100 MCG: 10 INJECTION INTRAVENOUS at 14:25

## 2020-02-05 RX ADMIN — FENTANYL CITRATE 50 MCG: 50 INJECTION, SOLUTION INTRAMUSCULAR; INTRAVENOUS at 14:06

## 2020-02-05 RX ADMIN — LIDOCAINE HYDROCHLORIDE 50 MG: 10 INJECTION, SOLUTION INFILTRATION; PERINEURAL at 14:06

## 2020-02-05 RX ADMIN — NEOSTIGMINE METHYLSULFATE 4 MG: 1 INJECTION, SOLUTION INTRAVENOUS at 16:05

## 2020-02-05 RX ADMIN — METHOCARBAMOL 750 MG: 750 TABLET ORAL at 22:23

## 2020-02-05 RX ADMIN — ROCURONIUM BROMIDE 10 MG: 10 INJECTION INTRAVENOUS at 15:27

## 2020-02-05 RX ADMIN — CEFAZOLIN SODIUM 2 G: 2 INJECTION, SOLUTION INTRAVENOUS at 14:06

## 2020-02-05 RX ADMIN — EPHEDRINE SULFATE 25 MG: 50 INJECTION INTRAMUSCULAR; INTRAVENOUS; SUBCUTANEOUS at 14:38

## 2020-02-05 RX ADMIN — FAMOTIDINE 20 MG: 20 TABLET ORAL at 10:10

## 2020-02-05 RX ADMIN — EPHEDRINE SULFATE 10 MG: 50 INJECTION INTRAMUSCULAR; INTRAVENOUS; SUBCUTANEOUS at 14:06

## 2020-02-05 RX ADMIN — LIDOCAINE HYDROCHLORIDE 0.3 ML: 10 INJECTION, SOLUTION EPIDURAL; INFILTRATION; INTRACAUDAL; PERINEURAL at 10:10

## 2020-02-05 RX ADMIN — GLYCOPYRROLATE 0.4 MG: 0.2 INJECTION, SOLUTION INTRAMUSCULAR; INTRAVENOUS at 16:05

## 2020-02-05 RX ADMIN — PHENYLEPHRINE HYDROCHLORIDE 100 MCG: 10 INJECTION INTRAVENOUS at 14:43

## 2020-02-05 RX ADMIN — EPHEDRINE SULFATE 10 MG: 50 INJECTION INTRAMUSCULAR; INTRAVENOUS; SUBCUTANEOUS at 14:34

## 2020-02-05 RX ADMIN — BISACODYL 5 MG: 5 TABLET, COATED ORAL at 18:29

## 2020-02-05 RX ADMIN — ESMOLOL HYDROCHLORIDE 20 MG: 10 INJECTION INTRAVENOUS at 14:06

## 2020-02-05 RX ADMIN — PHENYLEPHRINE HYDROCHLORIDE 1 MCG/KG/MIN: 10 INJECTION INTRAVENOUS at 14:29

## 2020-02-05 RX ADMIN — PROPOFOL 100 MG: 10 INJECTION, EMULSION INTRAVENOUS at 14:06

## 2020-02-05 RX ADMIN — PHENYLEPHRINE HYDROCHLORIDE 1 MCG/KG/MIN: 10 INJECTION INTRAVENOUS at 15:13

## 2020-02-05 RX ADMIN — PROPOFOL 25 MCG/KG/MIN: 10 INJECTION, EMULSION INTRAVENOUS at 14:27

## 2020-02-05 RX ADMIN — ONDANSETRON 4 MG: 2 INJECTION INTRAMUSCULAR; INTRAVENOUS at 15:59

## 2020-02-05 RX ADMIN — PHENYLEPHRINE HYDROCHLORIDE 100 MCG: 10 INJECTION INTRAVENOUS at 14:38

## 2020-02-05 RX ADMIN — ATORVASTATIN CALCIUM 20 MG: 20 TABLET, FILM COATED ORAL at 22:23

## 2020-02-05 RX ADMIN — DEXTROSE MONOHYDRATE 2 G: 50 INJECTION, SOLUTION INTRAVENOUS at 22:24

## 2020-02-05 RX ADMIN — ROCURONIUM BROMIDE 50 MG: 10 INJECTION INTRAVENOUS at 14:06

## 2020-02-05 RX ADMIN — SODIUM CHLORIDE, POTASSIUM CHLORIDE, SODIUM LACTATE AND CALCIUM CHLORIDE 9 ML/HR: 600; 310; 30; 20 INJECTION, SOLUTION INTRAVENOUS at 10:18

## 2020-02-05 RX ADMIN — LATANOPROST 1 DROP: 50 SOLUTION OPHTHALMIC at 22:23

## 2020-02-05 RX ADMIN — TAMSULOSIN HYDROCHLORIDE 0.4 MG: 0.4 CAPSULE ORAL at 22:23

## 2020-02-05 NOTE — ANESTHESIA POSTPROCEDURE EVALUATION
Patient: Frank Gunderson    Procedure Summary     Date:  02/05/20 Room / Location:   [facility]  /  [facility]    Anesthesia Start:  1402 Anesthesia Stop:  1622    Procedure:  CERVICAL LAMINECTOMY DECOMPRESSION POSTERIOR C3 (N/A Spine Cervical) Diagnosis:       Chronic cervical radiculopathy      Spinal stenosis of cervical region      (Chronic cervical radiculopathy [M54.12])      (Spinal stenosis of cervical region [M48.02])    Surgeon:  Marcell Barrow MD Provider:  Karla Cantu MD    Anesthesia Type:  general ASA Status:  3          Anesthesia Type: general    Vitals  Vitals Value Taken Time   /82 2/5/2020  4:20 PM   Temp     Pulse 91 2/5/2020  4:21 PM   Resp     SpO2 97 % 2/5/2020  4:21 PM   Vitals shown include unvalidated device data.        Post Anesthesia Care and Evaluation    Patient location during evaluation: PACU  Patient participation: complete - patient participated  Level of consciousness: awake and alert  Pain score: 0  Pain management: adequate  Airway patency: patent  Anesthetic complications: No anesthetic complications  PONV Status: none  Cardiovascular status: hemodynamically stable and acceptable  Respiratory status: nonlabored ventilation, acceptable and nasal cannula  Hydration status: acceptable

## 2020-02-05 NOTE — OP NOTE
NEUROSURGICAL OPERATIVE NOTE        PREOPERATIVE DIAGNOSIS:    Cervical myelopathy C2-3 stenosis      POSTOPERATIVE DIAGNOSIS:  Same      PROCEDURE:  C3, partial C2 laminectomy  Intraoperative monitoring      SURGEON:  Marcell Barrow M.D.      ASSISTANT: Jesica Coleman PA-C      ANESTHESIA:  General      ESTIMATED BLOOD LOSS:  Minimal      SPECIMEN:  Sent to pathology      DRAINS:  None      COMPLICATIONS:  None apparent          PROCEDURE IN DETAIL:  Saw this very pleasant 79-year-old gentleman in neurosurgical consultation he presented with cervical myelopathy.    We discussed risk and benefits of surgery.  He was taken the operating room placed in [place] three-point head fixation rolled prone all bony prominences and genitalia were padded.    Patient was placed in mild  position with the chin tucked.  Local infiltration lidocaine and epinephrine was performed.  Midline incision was made the nuchal raphae was dissected clear the under surface of C2 as well as C3 lamina was identified laminectomy was performed at C3.  Very thickened calcific ligament was noted under the C2-3 area this was undercut very carefully.  Adequate decompression was confirmed the C2-3 facet joints were severely arthritic with widened joint spaces but did appear stable on lateral x-rays.  At the resolution of the case meticulous hemostasis was maintained paraspinal musculature fascia and skin were closed in layers SSEP monitoring was stable throughout the case        Marcell HICKS

## 2020-02-05 NOTE — PLAN OF CARE
Problem: Patient Care Overview  Goal: Plan of Care Review  Outcome: Ongoing (interventions implemented as appropriate)  Flowsheets (Taken 2/5/2020 2026)  Progress: improving  Plan of Care Reviewed With: patient; spouse  Outcome Summary: pain controlled post recovery at this time site cdi pt room air family bedside waiting

## 2020-02-05 NOTE — ANESTHESIA PREPROCEDURE EVALUATION
Anesthesia Evaluation     Patient summary reviewed and Nursing notes reviewed   NPO Solid Status: > 8 hours  NPO Liquid Status: > 2 hours           Airway   Mallampati: II  TM distance: >3 FB  Neck ROM: full  No difficulty expected  Dental      Pulmonary    (+) a smoker (remote) Former,   (-) COPD, asthma, shortness of breath, recent URI, sleep apnea  Cardiovascular     ECG reviewed    (+) hypertension, CAD, cardiac stents (4 years ago [facility] ) more than 12 months ago hyperlipidemia,     ROS comment: Sinus bradycardia with premature supraventricular complexes      Neuro/Psych  (+) numbness,     (-) seizures, CVA    ROS Comment: L spinal stenosis N claudication   L radiculopathy  Cervical radiculopathy  GI/Hepatic/Renal/Endo    (+)   renal disease (creat 2.5 ) CRI,   (-) diabetes, no thyroid disorder    Musculoskeletal     Abdominal    Substance History      OB/GYN          Other        (-) history of cancer (skin )                Anesthesia Plan    ASA 3     general   (Elevated creatnine -new finding   NS over LR )  intravenous induction     Anesthetic plan, all risks, benefits, and alternatives have been provided, discussed and informed consent has been obtained with: patient.    Plan discussed with CRNA.

## 2020-02-05 NOTE — INTERVAL H&P NOTE
Pre-Op H&P (See Recent Office Note Attached for Full H&P)    Chief complaint: Back pain and left leg pain    HPI:      Patient is an 79 y.o. male who presents with a history of back pain and left leg pain.  He states his back pain radiates to his left hip with walking. He is unable to for any period of time.  MRI showed lumbar stenosis at L4/5 with some at the L3/4 level as well. 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 Conservative treatment has failed to provide significant relief. Surgical intervention is recommended and he is agreeable. He is here today for cervical laminectomy decompression posterior C3.    Review of Systems:  General ROS:  no fever, chills, rashes, No change since last office visit  Cardiovascular ROS: no chest pain or dyspnea on exertion; +CAD- s/p cardiac cath w/stent, +HTN, +dyslipidemia; patient was seen by cardiologist in early January for CP-deemed to be musculoskeletal in nature due to unusual physical activity the day prior- recommendation by cardiologist is to continue with current cardiac regimen  Respiratory ROS: no cough, shortness of breath, or wheezing; former cigarette smoker (1 ppd x 15 years)- quit 1970    Meds:    No current facility-administered medications on file prior to encounter.      Current Outpatient Medications on File Prior to Encounter   Medication Sig Dispense Refill   • aspirin 81 MG EC tablet Take 81 mg by mouth Daily.     • atorvastatin (LIPITOR) 20 MG tablet Take 20 mg by mouth Daily.     • bimatoprost (LUMIGAN) 0.01 % ophthalmic drops Administer 1 drop to both eyes Every Night.     • felodipine (PLENDIL) 10 MG 24 hr tablet Take 10 mg by mouth Daily.     • Multiple Vitamins-Minerals (MULTIVITAMIN WITH MINERALS) tablet tablet Take 1 tablet by mouth Daily.     • tamsulosin (FLOMAX) 0.4 MG capsule 24 hr capsule Take 1 capsule by mouth Every Night.         Vital Signs:  /94 (BP Location: Right arm, Patient Position:  "Lying)   Pulse 75   Temp 97.2 °F (36.2 °C) (Temporal)   Resp 18   Ht 188 cm (74\")   Wt 90.3 kg (199 lb)   SpO2 98%   BMI 25.55 kg/m²     Physical Exam:    CV:  S1S2 regular rate and rhythm, no murmur               Resp:  Clear to auscultation; respirations regular, even and unlabored    Results Review:     Lab Results   Component Value Date    WBC 6.17 01/27/2020    HGB 14.2 01/27/2020    HCT 43.0 01/27/2020    MCV 99.3 (H) 01/27/2020     01/27/2020    GLUCOSE 67 01/27/2020    BUN 30 (H) 01/27/2020    CREATININE 2.50 (H) 01/27/2020    EGFRIFNONA 25 (L) 01/27/2020     01/27/2020    K 4.6 01/27/2020     01/27/2020    CO2 24.0 01/27/2020    CALCIUM 9.5 01/27/2020        I reviewed the patient's new clinical results.     1/27/20 ECG: reviewed, sinus bradycardia with ventricular rate of 58 with premature supraventricular complexes    Cancer Staging (if applicable)  Cancer Patient: __ yes _X_no __unknown; If yes, clinical stage T:__ N:__M:__, stage group or __N/A    Assessment:  1. Severe cervical stenosis at C3 / lower end of C2.   2. Anterolisthesis of C4-C5 without instability    Plan:   1. Cervical laminectomy decompression posterior C3  2. Anesthesia aware of elevated BP. Will continue to monitor closely.        Jacqueline Caba, APRN  2/5/2020   10:07 AM    "

## 2020-02-05 NOTE — ANESTHESIA PROCEDURE NOTES
Airway  Urgency: elective    Date/Time: 2/5/2020 2:09 PM  Airway not difficult    General Information and Staff    Patient location during procedure: OR  CRNA: Preston Novak CRNA    Indications and Patient Condition  Indications for airway management: airway protection    Preoxygenated: yes  MILS not maintained throughout  Mask difficulty assessment: 1 - vent by mask    Final Airway Details  Final airway type: endotracheal airway      Successful airway: ETT  Cuffed: yes   Successful intubation technique: video laryngoscopy  Facilitating devices/methods: intubating stylet  Endotracheal tube insertion site: oral  Blade: David  Blade size: 3  ETT size (mm): 7.0  Cormack-Lehane Classification: grade I - full view of glottis  Placement verified by: chest auscultation and capnometry   Cuff volume (mL): 8  Measured from: lips  ETT/EBT  to lips (cm): 20  Number of attempts at approach: 1  Assessment: lips, teeth, and gum same as pre-op and atraumatic intubation    Additional Comments  Negative epigastric sounds, Breath sound equal bilaterally with symmetric chest rise and fall. Neck kept in neutral position thoroughout intubation

## 2020-02-06 VITALS
HEART RATE: 81 BPM | OXYGEN SATURATION: 95 % | DIASTOLIC BLOOD PRESSURE: 96 MMHG | WEIGHT: 199 LBS | SYSTOLIC BLOOD PRESSURE: 161 MMHG | BODY MASS INDEX: 25.54 KG/M2 | TEMPERATURE: 98.5 F | RESPIRATION RATE: 16 BRPM | HEIGHT: 74 IN

## 2020-02-06 PROCEDURE — 99024 POSTOP FOLLOW-UP VISIT: CPT | Performed by: PHYSICIAN ASSISTANT

## 2020-02-06 PROCEDURE — 25010000002 CEFAZOLIN PER 500 MG: Performed by: NEUROLOGICAL SURGERY

## 2020-02-06 RX ORDER — OXYCODONE HYDROCHLORIDE AND ACETAMINOPHEN 5; 325 MG/1; MG/1
1 TABLET ORAL EVERY 4 HOURS PRN
Qty: 40 TABLET | Refills: 0
Start: 2020-02-06 | End: 2020-05-14

## 2020-02-06 RX ORDER — METHOCARBAMOL 750 MG/1
750 TABLET, FILM COATED ORAL 3 TIMES DAILY PRN
Qty: 50 TABLET | Refills: 1 | Status: SHIPPED | OUTPATIENT
Start: 2020-02-06 | End: 2020-05-14

## 2020-02-06 RX ORDER — BISACODYL 5 MG/1
5 TABLET, DELAYED RELEASE ORAL DAILY PRN
Qty: 20 TABLET | Refills: 0 | Status: SHIPPED | OUTPATIENT
Start: 2020-02-06 | End: 2020-05-14

## 2020-02-06 RX ADMIN — BISACODYL 5 MG: 5 TABLET, COATED ORAL at 08:11

## 2020-02-06 RX ADMIN — DEXTROSE MONOHYDRATE 2 G: 50 INJECTION, SOLUTION INTRAVENOUS at 05:25

## 2020-02-06 RX ADMIN — METHOCARBAMOL 750 MG: 750 TABLET ORAL at 05:25

## 2020-02-06 RX ADMIN — FELODIPINE 10 MG: 5 TABLET, FILM COATED, EXTENDED RELEASE ORAL at 08:11

## 2020-02-06 NOTE — PLAN OF CARE
Problem: Pain, Acute (Adult)  Goal: Identify Related Risk Factors and Signs and Symptoms  Outcome: Outcome(s) achieved

## 2020-02-06 NOTE — PROGRESS NOTES
Discharge Planning Assessment  [facility]     Patient Name: Frank Gunderson  MRN: [id number]  Today's Date: 2/6/2020    Admit Date: 2/5/2020    Discharge Needs Assessment     Row Name 02/06/20 1114       Living Environment    Lives With  spouse    Name(s) of Who Lives With Patient  Wife:  Lo    Current Living Arrangements  home/apartment/condo    Family Caregiver if Needed  spouse    Quality of Family Relationships  helpful;involved;supportive    Able to Return to Prior Arrangements  yes       Resource/Environmental Concerns    Transportation Concerns  car, none       Transition Planning    Patient/Family Anticipates Transition to  home with family    Transportation Anticipated  family or friend will provide       Discharge Needs Assessment    Readmission Within the Last 30 Days  no previous admission in last 30 days    Concerns to be Addressed  no discharge needs identified    Equipment Currently Used at Home  none    Equipment Needed After Discharge  none        Discharge Plan     Row Name 02/06/20 1115       Plan    Plan  Home with wife's assistance    Patient/Family in Agreement with Plan  yes    Plan Comments  Met with Mr. Gunderson and his wife, Lo, at the bedside for discharge planning.  Mr. Gunderson lives with his wife in a one story home in [place].  The patient is ambulating independently.  He denies any DME or home health needs.  He stated that Lo can assist him at home as needed and she will be transporting him home at discharge.    CM will continue to follow.    Final Discharge Disposition Code  01 - home or self-care        Destination      Coordination has not been started for this encounter.      Durable Medical Equipment      Coordination has not been started for this encounter.      Dialysis/Infusion      Coordination has not been started for this encounter.      Home Medical Care      Coordination has not been started for this encounter.      Therapy      Coordination has not been  started for this encounter.      Community Resources      Coordination has not been started for this encounter.        Expected Discharge Date and Time     Expected Discharge Date Expected Discharge Time    Feb 6, 2020         Demographic Summary     Row Name 02/06/20 1113       General Information    Admission Type  observation    Arrived From  home    Reason for Consult  discharge planning    General Information Comments  PCP:  Norman Negrete       Contact Information    Permission Granted to Share Info With      Contact Information Comments  Wife:  Lo, rob [phone]        Functional Status     Row Name 02/06/20 1114       Functional Status    Usual Activity Tolerance  good    Current Activity Tolerance  good       Functional Status, IADL    Medications  independent    Meal Preparation  independent    Housekeeping  independent    Laundry  independent    Shopping  independent       Mental Status    General Appearance WDL  WDL        Psychosocial    No documentation.       Abuse/Neglect    No documentation.       Legal    No documentation.       Substance Abuse    No documentation.       Patient Forms    No documentation.           Fartun Ferrari RN

## 2020-02-06 NOTE — PLAN OF CARE
Problem: Patient Care Overview  Goal: Plan of Care Review  Flowsheets  Taken 2/5/2020 1741 by Stefanie Jha RN  Progress: improving  Taken 2/5/2020 2002 by Marcela Hernandez RN  Plan of Care Reviewed With: patient  Taken 2/6/2020 0442 by Marcela Hernandez RN  Outcome Summary: Pt did well throughout the night, no pain meds given per pt request.  Ambulated in hyatt w/standby assist.  Hard C-Collar in place.  Still has baseline lashay numbness to UE.  Coverderm w/small drainage noted.  Pt alert, oriented.  Anticipating discharge home on Thurs.     Problem: Laminectomy/Foraminotomy/Discectomy (Adult)  Goal: Signs and Symptoms of Listed Potential Problems Will be Absent, Minimized or Managed (Laminectomy/Foraminotomy/Discectomy)  Flowsheets (Taken 2/6/2020 0442)  Problems Assessed (Laminectomy/Laminotomy/Discectomy): all  Problems Present (Laminectomy/otomy): none

## 2020-02-27 ENCOUNTER — OFFICE VISIT (OUTPATIENT)
Dept: NEUROSURGERY | Facility: CLINIC | Age: 80
End: 2020-02-27

## 2020-02-27 VITALS — BODY MASS INDEX: 25.54 KG/M2 | WEIGHT: 199 LBS | HEIGHT: 74 IN | OXYGEN SATURATION: 98 % | HEART RATE: 74 BPM

## 2020-02-27 DIAGNOSIS — G95.9 CERVICAL MYELOPATHY (HCC): Primary | ICD-10-CM

## 2020-02-27 DIAGNOSIS — M48.02 SPINAL STENOSIS OF CERVICAL REGION: ICD-10-CM

## 2020-02-27 PROCEDURE — 99024 POSTOP FOLLOW-UP VISIT: CPT | Performed by: PHYSICIAN ASSISTANT

## 2020-02-27 NOTE — PROGRESS NOTES
Patient: Frank Gunderson  : 1940    Primary Care Provider: Norman Gomez DO      Chief Complaint: Wound check    History of Present Illness:       Patient very nice 79-year-old gentleman who is very active and came in to see Dr. Barrow with severe cervical stenosis patient underwent a C2-3 Cervical laminectomy on 2020.  Patient is here today for postop wound check.    Patient has noticed improvement in his gait as well as the numbness in his hands.  Also the pain and electric shock when he leans his head back is gotten much better.  Patient is very pleased with postsurgical outcome.  Patient did not take any narcotics postoperatively.    He continues to take muscle relaxers as needed.      Review of Systems   Constitutional: Negative for activity change, appetite change, chills, diaphoresis, fatigue, fever and unexpected weight change.   HENT: Negative for congestion, dental problem, drooling, ear discharge, ear pain, facial swelling, hearing loss, mouth sores, nosebleeds, postnasal drip, rhinorrhea, sinus pressure, sneezing, sore throat, tinnitus, trouble swallowing and voice change.    Eyes: Negative for photophobia, pain, discharge, redness, itching and visual disturbance.   Respiratory: Negative for apnea, cough, choking, chest tightness, shortness of breath, wheezing and stridor.    Cardiovascular: Negative for chest pain, palpitations and leg swelling.   Gastrointestinal: Negative for abdominal distention, abdominal pain, anal bleeding, blood in stool, constipation, diarrhea, nausea, rectal pain and vomiting.   Endocrine: Negative for cold intolerance, heat intolerance, polydipsia, polyphagia and polyuria.   Genitourinary: Negative for decreased urine volume, difficulty urinating, dysuria, enuresis, flank pain, frequency, genital sores, hematuria and urgency.   Musculoskeletal: Positive for neck stiffness. Negative for arthralgias, back pain, gait problem, joint swelling, myalgias and  neck pain.   Skin: Negative for color change, pallor, rash and wound.   Allergic/Immunologic: Negative for environmental allergies, food allergies and immunocompromised state.   Neurological: Negative for dizziness, tremors, seizures, syncope, facial asymmetry, speech difficulty, weakness, light-headedness, numbness and headaches.   Hematological: Negative for adenopathy. Does not bruise/bleed easily.   Psychiatric/Behavioral: Negative for agitation, behavioral problems, confusion, decreased concentration, dysphoric mood, hallucinations, self-injury, sleep disturbance and suicidal ideas. The patient is not nervous/anxious and is not hyperactive.    All other systems reviewed and are negative.      Past Medical History:     Past Medical History:   Diagnosis Date   • Cancer (CMS/[facility]) 2001    Prostate ( seeding)    • Coronary artery disease    • Glaucoma    • Heart disease    • Low back pain    • Skin cancer     nonmelaonma    • Spinal stenosis     cervical and lumbar        Family History:     Family History   Problem Relation Age of Onset   • Hypertension Father        Social History:    reports that he quit smoking about 50 years ago. He has a 15.00 pack-year smoking history. He has never used smokeless tobacco. He reports that he drinks about 14.0 standard drinks of alcohol per week. He reports that he does not use drugs.   SMOKING STATUS: Non-smoker    Surgical History:     Past Surgical History:   Procedure Laterality Date   • CARDIAC CATHETERIZATION     • CERVICAL LAMINECTOMY DECOMPRESSION POSTERIOR N/A 2/5/2020    Procedure: CERVICAL LAMINECTOMY DECOMPRESSION POSTERIOR C3;  Surgeon: Marcell Barrow MD;  Location: [facility];  Service: Neurosurgery;  Laterality: N/A;   • COLONOSCOPY  2017   • CORONARY STENT PLACEMENT     • EYE SURGERY Bilateral     bilateral cataract   • HERNIA REPAIR Bilateral     inguinal    • NERVE BLOCK     • ROTATOR CUFF REPAIR Right    • SKIN BIOPSY         Allergies:   Patient has no  "known allergies.    Physical Exam:    Vital Signs:Pulse 74   Ht 188 cm (74\")   Wt 90.3 kg (199 lb)   SpO2 98%   BMI 25.55 kg/m²    BMI: Body mass index is 25.55 kg/m².    GENERAL:   The patient is in no acute distress, and is able to answer all questions appropriately.  Skin:  The incision is well-healed and well approximated.  No signs of infection, bleeding, or erythema.  Musculoskeletal:     strength is 5 out of 5 bilaterally.   Shoulder abduction is 5 out of 5.    Dorsiflexion is 5/5 Bilaterally  Plantarflexion is 5/5 bilaterally  Hip Flexion 5/5 bilaterally.    The patient´s gait is normal without antalgia.  Neurologic:   The patient is alert and oriented by 3.     Pupils are equal and reactive to light.    Visual fields are full.    Extraocular movements are intact without nystagmus.    There is no evidence of central motor drift. No facial droop.  No difficulty with rapid alternating movements.    Sensation is equal bilaterally with no deficit.          Medical Decision Making    Data Review:   No new films reviewed at this visit    Diagnosis:   Cervical myelopathy   Status post C2-3 cervical laminectomy    Treatment Options:   Patient is doing very well at this point.  Patient is pleased postsurgical outcome.  Incision is clean, dry.  No signs of infection, bleeding, or erythema.    The patient will follow-up in 3 months after completing physical therapy. The patient understands instructions and limitations for the best post-operative success.    At that visit we can discuss his initial issue of his low back pain and left buttock pain.    It has been a pleasure providing neurosurgical care.    Jake Camarena PA-C        No diagnosis found.  "

## 2020-03-04 ENCOUNTER — TELEPHONE (OUTPATIENT)
Dept: NEUROSURGERY | Facility: CLINIC | Age: 80
End: 2020-03-04

## 2020-03-04 NOTE — TELEPHONE ENCOUNTER
PATIENT CALLED STATING HE HAS TRIED TO CONTACT OFFICE MULTIPLE TIMES REGARDING SCHEDULING FOR PHYSICAL THERAPY AT [facility].    PLEASE CALL PATIENT ASAP AT:    [phone]

## 2020-03-04 NOTE — TELEPHONE ENCOUNTER
We don't schedule PT for patients.  They are usually given an order at their visit to take to the facility of their choice.  If they didn't receive that we can send it in the mail.  If pt wants to go to PT through [facility] please fill in the info on the order and PT will call to schedule.

## 2020-04-01 ENCOUNTER — TREATMENT (OUTPATIENT)
Dept: PHYSICAL THERAPY | Facility: CLINIC | Age: 80
End: 2020-04-01

## 2020-04-01 DIAGNOSIS — M48.02 SPINAL STENOSIS OF CERVICAL REGION: ICD-10-CM

## 2020-04-01 DIAGNOSIS — Z98.890 H/O EXCISION OF LAMINA OF CERVICAL VERTEBRA FOR DECOMPRESSION OF SPINAL CORD: ICD-10-CM

## 2020-04-01 DIAGNOSIS — G95.9 CERVICAL MYELOPATHY (HCC): Primary | ICD-10-CM

## 2020-04-01 PROCEDURE — 97161 PT EVAL LOW COMPLEX 20 MIN: CPT | Performed by: PHYSICAL THERAPIST

## 2020-04-01 PROCEDURE — 97110 THERAPEUTIC EXERCISES: CPT | Performed by: PHYSICAL THERAPIST

## 2020-04-01 NOTE — PROGRESS NOTES
Physical Therapy Initial Evaluation and Plan of Care        Patient: Frank Gunderson   : 1940  Diagnosis/ICD-10 Code:  Cervical myelopathy (CMS/[facility]) [G95.9]  Referring practitioner: Jake Camarena PA-C  Date of Initial Visit: 2020  Today's Date: 2020  Patient seen for 1 sessions           Subjective Questionnaire: NDI:0      Subjective Evaluation    History of Present Illness  Mechanism of injury: Patient notes he was having lower back and left hip pain from spinal stenosis and has he was seeking care regarding this he was having LUE weakness and numbness.  He was found to have cervical stenosis and underwent laminectomy.  Since then he has had near resolution of his LUE symptoms.  He notes a little stiffness with sitting at a computer for prolonged periods and some continued numbness along the fingertips that is a little less along the fifth digit.  He notes that he was having trouble dressing but that has since improved.  Still has lumbar and left hip issues but he is hoping to have that addressed at his next neurosurgical follow up.      Patient Occupation: retired  and  Pain  Current pain ratin  At best pain ratin  At worst pain ratin  Quality: tight and burning (numbness )  Relieving factors: support, rest and relaxation  Aggravating factors: repetitive movement, keyboarding and standing    Diagnostic Tests  X-ray: normal    Patient Goals  Patient goals for therapy: return to sport/leisure activities and decreased pain             Objective       Static Posture     Head  Forward and tilted left.    Cervical Spine  Shifted right.    Shoulders  Rounded.    Thoracic Spine  Flattened thoracic spine.    Palpation     Additional Palpation Details  Guarded right upper trap.  Guarded and stiff left facet column.    Active Range of Motion   Cervical/Thoracic Spine   Cervical    Flexion: 60 degrees   Extension: 40 degrees   Left lateral flexion: 20 degrees   Right  lateral flexion: 10 degrees   Left rotation: 65 degrees   Right rotation: 65 degrees   Left Shoulder   Normal active range of motion    Right Shoulder   Normal active range of motion    Strength/Myotome Testing     Left Shoulder     Planes of Motion   Flexion: 5   Abduction: 4+   External rotation at 0°: 4+     Right Shoulder     Planes of Motion   Flexion: 5   Abduction: 4+   External rotation at 0°: 4+     Left Elbow   Flexion: 5  Extension: 5    Right Elbow   Flexion: 5  Extension: 4+    Left Wrist/Hand   Wrist extension: 4+  Wrist flexion: 4+    Right Wrist/Hand   Wrist extension: 4+  Wrist flexion: 4+    Tests   Cervical     Left   Negative active compression (Reeves), cervical distraction and Spurling's sign.     Right   Negative active compression (Reeves), cervical distraction and Spurling's sign.          Assessment & Plan     Assessment  Impairments: abnormal or restricted ROM, activity intolerance, impaired physical strength, lacks appropriate home exercise program and pain with function  Assessment details: Patient presents with remaining numbness of the left finger tips and dependent stiffness of the cervical spine following C3 posterior laminectomy.  He is progressing well since the procedure but is restricted with right sidebending and demonstrates muscular guarding.  He would benefit from HEP education and manual techniques to address stiffness and remaining neurologic symptoms while in the acute phase of recovery.  Prognosis: good  Functional Limitations: carrying objects, lifting, uncomfortable because of pain and unable to perform repetitive tasks  Goals  Plan Goals: Short Term Goals 2 weeks  1. Patient to be compliant with initial HEP  2. Patient to demonstrate pain free cervical right sidebend.  3. Patient to sidebend right at least 20 degrees.    Long Term Goals 4 weeks  1. Patient to be independent with HEP.  2. Patient to report reduced LUE numbness and tingling  3. Patient to sit at a  computer for at least 30 minutes without cervical spine stiffness.  4. Patient to demonstrate minimal palpable tenderness along the left facet column and right upper trap..          Plan  Therapy options: will be seen for skilled physical therapy services  Planned modality interventions: TENS, traction, thermotherapy (hydrocollator packs), cryotherapy and ultrasound  Planned therapy interventions: manual therapy, strengthening, stretching, postural training, joint mobilization and neuromuscular re-education  Frequency: 1x week  Duration in visits: 3  Treatment plan discussed with: patient  Plan details: Patient to be seen 1-2 additional visits for HEP initiation and education of self mobilization techniques to address stiffness, numbness, and postural deviations.        Timed:  Manual Therapy:    0     mins  11039;  Therapeutic Exercise:    15     mins  25948;     Neuromuscular Belle:    0    mins  65626;    Therapeutic Activity:     0     mins  17774;     Gait Trainin     mins  23660;     Ultrasound:     0     mins  46176;    Electrical Stimulation:    0     mins  40326 ( );    Untimed:  Electrical Stimulation:    0     mins  67276 ( );  Mechanical Traction:    0     mins  14861;     Timed Treatment:   15   mins   Total Treatment:     45   mins    PT SIGNATURE: Kj Diop, PT   DATE TREATMENT INITIATED: 2020    Medicare Initial Certification  Certification Period: 2020  I certify that the therapy services are furnished while this patient is under my care.  The services outlined above are required by this patient, and will be reviewed every 90 days.     PHYSICIAN: Jake Camarena PA-C      DATE:     Please sign and return via fax to [phone].. Thank you, [facility].

## 2020-04-07 ENCOUNTER — TREATMENT (OUTPATIENT)
Dept: PHYSICAL THERAPY | Facility: CLINIC | Age: 80
End: 2020-04-07

## 2020-04-07 DIAGNOSIS — Z98.890 H/O EXCISION OF LAMINA OF CERVICAL VERTEBRA FOR DECOMPRESSION OF SPINAL CORD: ICD-10-CM

## 2020-04-07 DIAGNOSIS — G95.9 CERVICAL MYELOPATHY (HCC): Primary | ICD-10-CM

## 2020-04-07 DIAGNOSIS — M48.02 SPINAL STENOSIS OF CERVICAL REGION: ICD-10-CM

## 2020-04-07 PROCEDURE — 97110 THERAPEUTIC EXERCISES: CPT | Performed by: PHYSICAL THERAPIST

## 2020-04-07 NOTE — PROGRESS NOTES
Physical Therapy Daily Progress Note        Patient: Frank Gunderson   : 1940  Diagnosis/ICD-10 Code:  Cervical myelopathy (CMS/[facility]) [G95.9]  Referring practitioner: Jake Camarena PA-C  Date of Initial Visit: Type: THERAPY  Noted: 2020  Today's Date: 2020  Patient seen for 2 sessions         Patient reports his stiffness and numbness may be a little better but both continue to be present.  He has been working on his exercises 2-3x/day.  He notes neuro follow up on Thursday and he is hoping to address his lower back complaints. His pain level is 0-1/10 upon arrival.          Objective   See Exercise, Manual, and Modality Logs for complete treatment.       Assessment & Plan     Assessment  Assessment details: Patient continues to have intermittent neurologic symptoms in the hand and continues to be restricted in right sided mobility.  He requires a lot of cueing for correct exercise completion and would benefit from at least one additional session.    Plan  Plan details: Follow up one additional visit to confirm HEP correctness and finalize.  Follow up on neurosurgical consult and plan to d/c vs transition to I HEP.            Timed:  Manual Therapy:    0     mins  44625;  Therapeutic Exercise:    40     mins  49534;     Neuromuscular Belle:    0    mins  94332;    Therapeutic Activity:     0     mins  74613;     Gait Trainin     mins  47668;     Ultrasound:     0     mins  19003;    Electrical Stimulation:    0     mins  03407 ( );    Untimed:  Electrical Stimulation:    0     mins  23484 ( );  Mechanical Traction:    0     mins  28103;     Timed Treatment:   40   mins   Total Treatment:     40   mins  Kj Diop, PT  Physical Therapist

## 2020-04-09 ENCOUNTER — OFFICE VISIT (OUTPATIENT)
Dept: NEUROSURGERY | Facility: CLINIC | Age: 80
End: 2020-04-09

## 2020-04-09 VITALS
BODY MASS INDEX: 25.28 KG/M2 | SYSTOLIC BLOOD PRESSURE: 118 MMHG | DIASTOLIC BLOOD PRESSURE: 70 MMHG | WEIGHT: 197 LBS | TEMPERATURE: 97.9 F | HEIGHT: 74 IN

## 2020-04-09 DIAGNOSIS — G95.9 CERVICAL MYELOPATHY (HCC): Primary | ICD-10-CM

## 2020-04-09 PROCEDURE — 99024 POSTOP FOLLOW-UP VISIT: CPT | Performed by: PHYSICIAN ASSISTANT

## 2020-04-09 NOTE — PROGRESS NOTES
"Subjective   Patient ID: Frank Gunderson is a 79 y.o. male is here today for follow-up for wound check.    HPI:      Patient very nice 79-year-old gentleman who is well-known to the neurosurgical practice for undergoing a C2-3 posterior cervical laminectomy.  Procedures performed on 2/5/2020 patient has gotten great relief in his hand numbness and his balance is improved drastically.     Patient is very excited about moving forward with his lumbar surgery but he understands that with the pandemic going on being around healthcare system for elective procedures as not indicated at this time.  Patient is very understanding about this and will call us back when the pandemic is on the downside and we resume normal activity.     Patient is to continue with stretching at home and this seems to help but he does have that fairly profuse lower lumbar pain that is worse with walking and neurogenic claudication.    The following portions of the patient's history were reviewed and updated as appropriate: allergies, current medications, past family history, past medical history, past social history, past surgical history and problem list.    Review of Systems   Musculoskeletal: Positive for neck stiffness.   All other systems reviewed and are negative.        Objective    reports that he quit smoking about 50 years ago. He has a 15.00 pack-year smoking history. He has never used smokeless tobacco. He reports that he drinks about 14.0 standard drinks of alcohol per week. He reports that he does not use drugs.   SMOKING STATUS: Non-smoker    Physical Exam:   Vitals:/70   Temp 97.9 °F (36.6 °C)   Ht 188 cm (74\")   Wt 89.4 kg (197 lb)   BMI 25.29 kg/m²    BMI: Body mass index is 25.29 kg/m².     Patient strength is equal in the upper extremities with bilateral  strength 5 out of 5 bicep flexion tricep extension 5 out of 5    Pupils equal reactive to light extraocular movements intact.    No Pike's or clonus noted " bilaterally    Hip flexion dorsiflexion plantar flexion all 5 out of 5 bilaterally    Posterior cervical incision is intact with normal healing    Assessment/Plan   Independent Review of Radiographic Studies:    No scans reviewed today  Medical Decision Making:    Patient is going to keep is here today ground and see when operations resume normalcy.  Patient will then call to discuss his lumbar laminectomy.    It has been a pleasure providing neurosurgical care.    Jake Camarena PA-C  There are no diagnoses linked to this encounter.  No follow-ups on file.

## 2020-04-14 ENCOUNTER — TREATMENT (OUTPATIENT)
Dept: PHYSICAL THERAPY | Facility: CLINIC | Age: 80
End: 2020-04-14

## 2020-04-14 DIAGNOSIS — Z98.890 H/O EXCISION OF LAMINA OF CERVICAL VERTEBRA FOR DECOMPRESSION OF SPINAL CORD: ICD-10-CM

## 2020-04-14 DIAGNOSIS — G95.9 CERVICAL MYELOPATHY (HCC): Primary | ICD-10-CM

## 2020-04-14 DIAGNOSIS — M48.02 SPINAL STENOSIS OF CERVICAL REGION: ICD-10-CM

## 2020-04-14 PROCEDURE — 97110 THERAPEUTIC EXERCISES: CPT | Performed by: PHYSICAL THERAPIST

## 2020-04-14 NOTE — PROGRESS NOTES
Physical Therapy Daily Progress Note/ DISCHARGE      Patient: Frank Gunderson   : 1940  Diagnosis/ICD-10 Code:  Cervical myelopathy (CMS/[facility]) [G95.9]  Referring practitioner: Jake Camarena PA-C  Date of Initial Visit: Type: THERAPY  Noted: 2020  Today's Date: 2020  Patient seen for 3 sessions         Patient reports that he is pleased with progress.  He is hoping to have his lumbar surgery soon.  He complains of a little more continued stiffness in the cervical spine and tingling in the hand along the finger tips with less along the fifth. His pain level is 0-1/10 upon arrival.      Objective   Good segmental mobility despite active restriciton of right sidebend.    See Exercise, Manual, and Modality Logs for complete treatment.       Assessment & Plan     Assessment  Assessment details: Patient attended three PT sessions and is compliant with his HEP but still requires occasional cueing for correct performance.  He reports mild tingling that is persistent and overall cervical stiffness.  At this time, though, his condition is no longer considered urgent per CDC guidelines and he is compliant with his exercises.  It is best to d/c to I HEP since his symptoms are generally improving and he is happy with his progress.    Goals  Plan Goals: Short Term Goals 2 weeks  1. Patient to be compliant with initial HEP MET  2. Patient to demonstrate pain free cervical right sidebend. MET  3. Patient to sidebend right at least 20 degrees. NOT MET    Long Term Goals 4 weeks  1. Patient to be independent with HEP. MET  2. Patient to report reduced LUE numbness and tingling NOT MET  3. Patient to sit at a computer for at least 30 minutes without cervical spine stiffness. NOT MET  4. Patient to demonstrate minimal palpable tenderness along the left facet column and right upper trap.. MET      Plan  Plan details: D/C to I HEP.              Timed:  Manual Therapy:    0     mins  97040;  Therapeutic Exercise:    29      mins  37547;     Neuromuscular Belle:    0    mins  46901;    Therapeutic Activity:     0     mins  37767;     Gait Trainin     mins  30144;     Ultrasound:     0     mins  63557;    Electrical Stimulation:    0     mins  70124 ( );    Untimed:  Electrical Stimulation:    0     mins  81591 ( );  Mechanical Traction:    0     mins  81001;     Timed Treatment:   29   mins   Total Treatment:     29   mins  Kj Diop, PT  Physical Therapist

## 2020-05-04 ENCOUNTER — TELEPHONE (OUTPATIENT)
Dept: NEUROSURGERY | Facility: CLINIC | Age: 80
End: 2020-05-04

## 2020-05-04 NOTE — TELEPHONE ENCOUNTER
PATIENT INQUIRING WHEN ELECTIVE SURGERIES WILL RESUME SO THEY CAN DISCUSS FURTHER THAT OPTION WITH PROVIDER.    PLEASE CONTACT PATIENT -[phone] FOR FURTHER INSTRUCTION

## 2020-05-04 NOTE — TELEPHONE ENCOUNTER
I was able to speak with Mr. Gunderson.  - I informed him that Jake would like to see him back in the office prior to scheduling his lumbar laminectomy to confirm symptoms for surgical planning.     Our office will be in contact in the next several weeks as elective procedures are available to schedule.

## 2020-05-14 ENCOUNTER — OFFICE VISIT (OUTPATIENT)
Dept: NEUROSURGERY | Facility: CLINIC | Age: 80
End: 2020-05-14

## 2020-05-14 ENCOUNTER — PREP FOR SURGERY (OUTPATIENT)
Dept: OTHER | Facility: HOSPITAL | Age: 80
End: 2020-05-14

## 2020-05-14 VITALS — BODY MASS INDEX: 25.54 KG/M2 | HEIGHT: 74 IN | WEIGHT: 199 LBS | TEMPERATURE: 97 F

## 2020-05-14 DIAGNOSIS — M51.36 DDD (DEGENERATIVE DISC DISEASE), LUMBAR: ICD-10-CM

## 2020-05-14 DIAGNOSIS — M47.819 FACET ARTHROPATHY: ICD-10-CM

## 2020-05-14 DIAGNOSIS — M48.062 SPINAL STENOSIS OF LUMBAR REGION WITH NEUROGENIC CLAUDICATION: Primary | ICD-10-CM

## 2020-05-14 DIAGNOSIS — M54.50 LUMBAGO: Primary | ICD-10-CM

## 2020-05-14 PROCEDURE — 99214 OFFICE O/P EST MOD 30 MIN: CPT | Performed by: NEUROLOGICAL SURGERY

## 2020-05-14 RX ORDER — SODIUM CHLORIDE 0.9 % (FLUSH) 0.9 %
3-10 SYRINGE (ML) INJECTION AS NEEDED
Status: CANCELLED | OUTPATIENT
Start: 2020-05-14

## 2020-05-14 RX ORDER — CEFAZOLIN SODIUM 2 G/100ML
2 INJECTION, SOLUTION INTRAVENOUS ONCE
Status: CANCELLED | OUTPATIENT
Start: 2020-05-14 | End: 2020-05-14

## 2020-05-14 RX ORDER — IBUPROFEN 800 MG/1
800 TABLET ORAL ONCE
Status: CANCELLED | OUTPATIENT
Start: 2020-05-14 | End: 2020-05-14

## 2020-05-14 RX ORDER — CHLORHEXIDINE GLUCONATE 4 G/100ML
SOLUTION TOPICAL
Qty: 120 ML | Refills: 0 | Status: ON HOLD | OUTPATIENT
Start: 2020-05-14 | End: 2020-06-02

## 2020-05-14 RX ORDER — SODIUM CHLORIDE 0.9 % (FLUSH) 0.9 %
3 SYRINGE (ML) INJECTION EVERY 12 HOURS SCHEDULED
Status: CANCELLED | OUTPATIENT
Start: 2020-05-14

## 2020-05-14 RX ORDER — HYDROCODONE BITARTRATE AND ACETAMINOPHEN 7.5; 325 MG/1; MG/1
1 TABLET ORAL ONCE
Status: CANCELLED | OUTPATIENT
Start: 2020-05-14 | End: 2020-05-14

## 2020-05-14 RX ORDER — SODIUM CHLORIDE, SODIUM LACTATE, POTASSIUM CHLORIDE, CALCIUM CHLORIDE 600; 310; 30; 20 MG/100ML; MG/100ML; MG/100ML; MG/100ML
9 INJECTION, SOLUTION INTRAVENOUS CONTINUOUS
Status: CANCELLED | OUTPATIENT
Start: 2020-05-14

## 2020-05-14 RX ORDER — ACETAMINOPHEN 325 MG/1
650 TABLET ORAL ONCE
Status: CANCELLED | OUTPATIENT
Start: 2020-05-14 | End: 2020-05-14

## 2020-05-14 RX ORDER — FAMOTIDINE 20 MG/1
20 TABLET, FILM COATED ORAL
Status: CANCELLED | OUTPATIENT
Start: 2020-05-14

## 2020-05-14 NOTE — PROGRESS NOTES
NAME: DARREL VELIZ   DOS: 2020  : 1940  PCP: Norman Gomez DO    Chief Complaint:    Chief Complaint   Patient presents with   • Low back & left leg pain       History of Present Illness:  79 y.o. male   I saw this 79-year-old gentleman well-known to me for a history of cervical spondylitic myelopathy he underwent decompressive cervical laminectomy and had reduction of his hand symptoms as well as weakness in his leg    He presents with neurogenic claudication he had an MRI hip x-rays and is here for evaluation    He has pain in predominantly his left hip it radiates to his buttock he can walk for time but has to be bent over    PMHX  Allergies:  No Known Allergies  Medications    Current Outpatient Medications:   •  aspirin 81 MG EC tablet, Take 81 mg by mouth Daily., Disp: , Rfl:   •  atorvastatin (LIPITOR) 20 MG tablet, Take 20 mg by mouth Daily., Disp: , Rfl:   •  bimatoprost (LUMIGAN) 0.01 % ophthalmic drops, Administer 1 drop to both eyes Every Night., Disp: , Rfl:   •  felodipine (PLENDIL) 10 MG 24 hr tablet, Take 10 mg by mouth Daily., Disp: , Rfl:   •  Multiple Vitamins-Minerals (MULTIVITAMIN WITH MINERALS) tablet tablet, Take 1 tablet by mouth Daily., Disp: , Rfl:   •  olopatadine (PATANASE) 0.6 % solution nasal solution, 2 sprays by Each Nare route Daily., Disp: , Rfl:   •  tamsulosin (FLOMAX) 0.4 MG capsule 24 hr capsule, Take 1 capsule by mouth Every Night., Disp: , Rfl:   Past Medical History:  Past Medical History:   Diagnosis Date   • Cancer (CMS/HCC)     Prostate ( seeding)    • Coronary artery disease    • Glaucoma    • Heart disease    • Low back pain    • Skin cancer     nonmelaonma    • Spinal stenosis     cervical and lumbar      Past Surgical History:  Past Surgical History:   Procedure Laterality Date   • CARDIAC CATHETERIZATION     • CERVICAL LAMINECTOMY DECOMPRESSION POSTERIOR N/A 2020    Procedure: CERVICAL LAMINECTOMY DECOMPRESSION POSTERIOR C3;   Surgeon: Marcell Barrow MD;  Location: [facility];  Service: Neurosurgery;  Laterality: N/A;   • COLONOSCOPY  2017   • CORONARY STENT PLACEMENT     • EYE SURGERY Bilateral     bilateral cataract   • HERNIA REPAIR Bilateral     inguinal    • NERVE BLOCK     • ROTATOR CUFF REPAIR Right    • SKIN BIOPSY       Social Hx:  Social History     Tobacco Use   • Smoking status: Former Smoker     Packs/day: 1.00     Years: 15.00     Pack years: 15.00     Last attempt to quit: 1970     Years since quittin.4   • Smokeless tobacco: Never Used   Substance Use Topics   • Alcohol use: Yes     Alcohol/week: 14.0 standard drinks     Types: 14 Cans of beer per week     Frequency: Never   • Drug use: No     Family Hx:  Family History   Problem Relation Age of Onset   • Hypertension Father      Review of Systems:        Review of Systems   Constitutional: Negative for activity change, appetite change, chills, diaphoresis, fatigue, fever and unexpected weight change.   HENT: Negative for congestion, dental problem, drooling, ear discharge, ear pain, facial swelling, hearing loss, mouth sores, nosebleeds, postnasal drip, rhinorrhea, sinus pressure, sneezing, sore throat, tinnitus, trouble swallowing and voice change.    Eyes: Negative for photophobia, pain, discharge, redness, itching and visual disturbance.   Respiratory: Negative for apnea, cough, choking, chest tightness, shortness of breath, wheezing and stridor.    Cardiovascular: Negative for chest pain, palpitations and leg swelling.   Gastrointestinal: Negative for abdominal distention, abdominal pain, anal bleeding, blood in stool, constipation, diarrhea, nausea, rectal pain and vomiting.   Endocrine: Negative for cold intolerance, heat intolerance, polydipsia, polyphagia and polyuria.   Genitourinary: Negative for decreased urine volume, difficulty urinating, dysuria, enuresis, flank pain, frequency, genital sores, hematuria and urgency.   Musculoskeletal: Positive for  back pain. Negative for arthralgias, gait problem, joint swelling, myalgias, neck pain and neck stiffness.   Skin: Negative for color change, pallor, rash and wound.   Allergic/Immunologic: Negative for environmental allergies, food allergies and immunocompromised state.   Neurological: Negative for dizziness, tremors, seizures, syncope, facial asymmetry, speech difficulty, weakness, light-headedness, numbness and headaches.   Hematological: Negative for adenopathy. Does not bruise/bleed easily.   Psychiatric/Behavioral: Negative for agitation, behavioral problems, confusion, decreased concentration, dysphoric mood, hallucinations, self-injury, sleep disturbance and suicidal ideas. The patient is not nervous/anxious and is not hyperactive.    All other systems reviewed and are negative.     I have reviewed this note template and all pertinent parts of the review of systems social, family history, surgical history and medication list    Physical Examination:  Vitals:    05/14/20 1223   Temp: 97 °F (36.1 °C)      General Appearance:   Well developed, well nourished, well groomed, alert, and cooperative.  Neurological examination:  Neurologic Exam  Range of motion of the hips is good    Neck incisions well-healed    No signs of intrinsic hip dysfunction    Reasonable strength lower extremities gait is wide-based and stable    Review of Imaging/DATA:  MRI reviewed demonstrates significant lumbar disc disease at L4-5 left L5-S1 and 3 4  Diagnoses/Plan:    Mr. Gunderson is a 79 y.o. male     This gentleman presents with some advanced age but is otherwise very active he presents with neurogenic claudication referrable to left-sided likely L4-5 synovial cyst area he has bilateral high-grade stenosis lumbar flexion-extension films are normal hip x-rays look good    I did talk to them about the options from my standpoint I think a unilateral approach L51, L4, L3 hemilaminotomy from the left approach would be reasonable option  we can undercut bilaterally plus or minus completion L4 laminectomy I explained the risk benefits and expected outcome will make arrangements as such

## 2020-05-31 ENCOUNTER — APPOINTMENT (OUTPATIENT)
Dept: PREADMISSION TESTING | Facility: HOSPITAL | Age: 80
End: 2020-05-31

## 2020-05-31 VITALS — WEIGHT: 197.25 LBS | BODY MASS INDEX: 25.31 KG/M2 | HEIGHT: 74 IN

## 2020-05-31 DIAGNOSIS — M54.50 LUMBAGO: ICD-10-CM

## 2020-05-31 LAB
ANION GAP SERPL CALCULATED.3IONS-SCNC: 12 MMOL/L (ref 5–15)
BUN BLD-MCNC: 31 MG/DL (ref 8–23)
BUN/CREAT SERPL: 12.9 (ref 7–25)
CALCIUM SPEC-SCNC: 10 MG/DL (ref 8.6–10.5)
CHLORIDE SERPL-SCNC: 104 MMOL/L (ref 98–107)
CO2 SERPL-SCNC: 26 MMOL/L (ref 22–29)
CREAT BLD-MCNC: 2.41 MG/DL (ref 0.76–1.27)
DEPRECATED RDW RBC AUTO: 44.7 FL (ref 37–54)
ERYTHROCYTE [DISTWIDTH] IN BLOOD BY AUTOMATED COUNT: 12.2 % (ref 12.3–15.4)
GFR SERPL CREATININE-BSD FRML MDRD: 26 ML/MIN/1.73
GLUCOSE BLD-MCNC: 122 MG/DL (ref 65–99)
HCT VFR BLD AUTO: 45.6 % (ref 37.5–51)
HGB BLD-MCNC: 14.9 G/DL (ref 13–17.7)
MCH RBC QN AUTO: 32.3 PG (ref 26.6–33)
MCHC RBC AUTO-ENTMCNC: 32.7 G/DL (ref 31.5–35.7)
MCV RBC AUTO: 98.7 FL (ref 79–97)
MRSA DNA SPEC QL NAA+PROBE: NEGATIVE
PLATELET # BLD AUTO: 146 10*3/MM3 (ref 140–450)
PMV BLD AUTO: 9.8 FL (ref 6–12)
POTASSIUM BLD-SCNC: 4.9 MMOL/L (ref 3.5–5.2)
RBC # BLD AUTO: 4.62 10*6/MM3 (ref 4.14–5.8)
SODIUM BLD-SCNC: 142 MMOL/L (ref 136–145)
WBC NRBC COR # BLD: 4.05 10*3/MM3 (ref 3.4–10.8)

## 2020-05-31 PROCEDURE — U0002 COVID-19 LAB TEST NON-CDC: HCPCS

## 2020-05-31 PROCEDURE — 85027 COMPLETE CBC AUTOMATED: CPT | Performed by: ANESTHESIOLOGY

## 2020-05-31 PROCEDURE — 87641 MR-STAPH DNA AMP PROBE: CPT | Performed by: ANESTHESIOLOGY

## 2020-05-31 PROCEDURE — 80048 BASIC METABOLIC PNL TOTAL CA: CPT | Performed by: NEUROLOGICAL SURGERY

## 2020-05-31 PROCEDURE — U0004 COV-19 TEST NON-CDC HGH THRU: HCPCS

## 2020-05-31 PROCEDURE — C9803 HOPD COVID-19 SPEC COLLECT: HCPCS

## 2020-05-31 PROCEDURE — 36415 COLL VENOUS BLD VENIPUNCTURE: CPT

## 2020-05-31 RX ORDER — UBIDECARENONE 50 MG
CAPSULE ORAL DAILY
COMMUNITY
End: 2020-06-18

## 2020-06-01 LAB
REF LAB TEST METHOD: NORMAL
SARS-COV-2 RNA RESP QL NAA+PROBE: NOT DETECTED

## 2020-06-02 ENCOUNTER — ANESTHESIA (OUTPATIENT)
Dept: PERIOP | Facility: HOSPITAL | Age: 80
End: 2020-06-02

## 2020-06-02 ENCOUNTER — HOSPITAL ENCOUNTER (OUTPATIENT)
Facility: HOSPITAL | Age: 80
Discharge: HOME | End: 2020-06-04
Attending: NEUROLOGICAL SURGERY | Admitting: NEUROLOGICAL SURGERY

## 2020-06-02 ENCOUNTER — ANESTHESIA EVENT (OUTPATIENT)
Dept: PERIOP | Facility: HOSPITAL | Age: 80
End: 2020-06-02

## 2020-06-02 ENCOUNTER — APPOINTMENT (OUTPATIENT)
Dept: GENERAL RADIOLOGY | Facility: HOSPITAL | Age: 80
End: 2020-06-02

## 2020-06-02 DIAGNOSIS — M54.50 LUMBAGO: ICD-10-CM

## 2020-06-02 DIAGNOSIS — M48.062 LUMBAR STENOSIS WITH NEUROGENIC CLAUDICATION: Primary | ICD-10-CM

## 2020-06-02 PROCEDURE — A9270 NON-COVERED ITEM OR SERVICE: HCPCS | Performed by: NEUROLOGICAL SURGERY

## 2020-06-02 PROCEDURE — 25010000002 FENTANYL CITRATE (PF) 100 MCG/2ML SOLUTION: Performed by: NURSE ANESTHETIST, CERTIFIED REGISTERED

## 2020-06-02 PROCEDURE — 25010000002 DEXAMETHASONE PER 1 MG: Performed by: NURSE ANESTHETIST, CERTIFIED REGISTERED

## 2020-06-02 PROCEDURE — 25010000002 NEOSTIGMINE 10 MG/10ML SOLUTION: Performed by: NURSE ANESTHETIST, CERTIFIED REGISTERED

## 2020-06-02 PROCEDURE — 63048 LAM FACETEC &FORAMOT EA ADDL: CPT | Performed by: NEUROLOGICAL SURGERY

## 2020-06-02 PROCEDURE — 63710000001 LATANOPROST 0.005 % SOLUTION 2.5 ML BOTTLE: Performed by: NEUROLOGICAL SURGERY

## 2020-06-02 PROCEDURE — 76000 FLUOROSCOPY <1 HR PHYS/QHP: CPT

## 2020-06-02 PROCEDURE — 25010000003 CEFAZOLIN IN DEXTROSE 2-4 GM/100ML-% SOLUTION: Performed by: NEUROLOGICAL SURGERY

## 2020-06-02 PROCEDURE — 94799 UNLISTED PULMONARY SVC/PX: CPT

## 2020-06-02 PROCEDURE — A9270 NON-COVERED ITEM OR SERVICE: HCPCS | Performed by: ANESTHESIOLOGY

## 2020-06-02 PROCEDURE — 63710000001 HYDROCODONE-ACETAMINOPHEN 7.5-325 MG TABLET: Performed by: NEUROLOGICAL SURGERY

## 2020-06-02 PROCEDURE — 63710000001 FELODIPINE 5 MG TABLET SUSTAINED-RELEASE 24 HOUR: Performed by: NEUROLOGICAL SURGERY

## 2020-06-02 PROCEDURE — 25010000002 ONDANSETRON PER 1 MG: Performed by: NURSE ANESTHETIST, CERTIFIED REGISTERED

## 2020-06-02 PROCEDURE — 63710000001 ACETAMINOPHEN 325 MG TABLET: Performed by: NEUROLOGICAL SURGERY

## 2020-06-02 PROCEDURE — 63710000001 FAMOTIDINE 20 MG TABLET: Performed by: ANESTHESIOLOGY

## 2020-06-02 PROCEDURE — 63047 LAM FACETEC & FORAMOT LUMBAR: CPT | Performed by: NEUROLOGICAL SURGERY

## 2020-06-02 PROCEDURE — 25810000003 SODIUM CHLORIDE 0.9 % WITH KCL 20 MEQ 20-0.9 MEQ/L-% SOLUTION: Performed by: NEUROLOGICAL SURGERY

## 2020-06-02 PROCEDURE — 25010000002 PROPOFOL 10 MG/ML EMULSION: Performed by: NURSE ANESTHETIST, CERTIFIED REGISTERED

## 2020-06-02 PROCEDURE — 63048 LAM FACETEC &FORAMOT EA ADDL: CPT | Performed by: PHYSICIAN ASSISTANT

## 2020-06-02 PROCEDURE — 63047 LAM FACETEC & FORAMOT LUMBAR: CPT | Performed by: PHYSICIAN ASSISTANT

## 2020-06-02 PROCEDURE — 63710000001 METHOCARBAMOL 750 MG TABLET: Performed by: NEUROLOGICAL SURGERY

## 2020-06-02 DEVICE — FLOSEAL HEMOSTATIC MATRIX, 10ML
Type: IMPLANTABLE DEVICE | Status: FUNCTIONAL
Brand: FLOSEAL HEMOSTATIC MATRIX

## 2020-06-02 RX ORDER — FAMOTIDINE 10 MG/ML
20 INJECTION, SOLUTION INTRAVENOUS ONCE
Status: CANCELLED | OUTPATIENT
Start: 2020-06-02 | End: 2020-06-02

## 2020-06-02 RX ORDER — NALOXONE HCL 0.4 MG/ML
0.4 VIAL (ML) INJECTION
Status: DISCONTINUED | OUTPATIENT
Start: 2020-06-02 | End: 2020-06-04 | Stop reason: HOSPADM

## 2020-06-02 RX ORDER — BUPIVACAINE HYDROCHLORIDE 2.5 MG/ML
INJECTION, SOLUTION EPIDURAL; INFILTRATION; INTRACAUDAL AS NEEDED
Status: DISCONTINUED | OUTPATIENT
Start: 2020-06-02 | End: 2020-06-02 | Stop reason: HOSPADM

## 2020-06-02 RX ORDER — TEMAZEPAM 15 MG/1
15 CAPSULE ORAL NIGHTLY PRN
Status: DISCONTINUED | OUTPATIENT
Start: 2020-06-02 | End: 2020-06-04 | Stop reason: HOSPADM

## 2020-06-02 RX ORDER — HYDROCODONE BITARTRATE AND ACETAMINOPHEN 7.5; 325 MG/1; MG/1
1 TABLET ORAL EVERY 4 HOURS PRN
Status: DISCONTINUED | OUTPATIENT
Start: 2020-06-02 | End: 2020-06-04 | Stop reason: HOSPADM

## 2020-06-02 RX ORDER — TAMSULOSIN HYDROCHLORIDE 0.4 MG/1
0.4 CAPSULE ORAL DAILY
Status: DISCONTINUED | OUTPATIENT
Start: 2020-06-03 | End: 2020-06-04 | Stop reason: HOSPADM

## 2020-06-02 RX ORDER — LATANOPROST 50 UG/ML
1 SOLUTION/ DROPS OPHTHALMIC NIGHTLY
Status: DISCONTINUED | OUTPATIENT
Start: 2020-06-02 | End: 2020-06-04 | Stop reason: HOSPADM

## 2020-06-02 RX ORDER — ACETAMINOPHEN 325 MG/1
650 TABLET ORAL ONCE
Status: COMPLETED | OUTPATIENT
Start: 2020-06-02 | End: 2020-06-02

## 2020-06-02 RX ORDER — SODIUM CHLORIDE AND POTASSIUM CHLORIDE 150; 900 MG/100ML; MG/100ML
100 INJECTION, SOLUTION INTRAVENOUS CONTINUOUS
Status: DISCONTINUED | OUTPATIENT
Start: 2020-06-02 | End: 2020-06-04 | Stop reason: HOSPADM

## 2020-06-02 RX ORDER — METHOCARBAMOL 750 MG/1
750 TABLET, FILM COATED ORAL EVERY 8 HOURS SCHEDULED
Status: DISCONTINUED | OUTPATIENT
Start: 2020-06-02 | End: 2020-06-04 | Stop reason: HOSPADM

## 2020-06-02 RX ORDER — SODIUM CHLORIDE 0.9 % (FLUSH) 0.9 %
10 SYRINGE (ML) INJECTION AS NEEDED
Status: DISCONTINUED | OUTPATIENT
Start: 2020-06-02 | End: 2020-06-04 | Stop reason: HOSPADM

## 2020-06-02 RX ORDER — PROPOFOL 10 MG/ML
VIAL (ML) INTRAVENOUS AS NEEDED
Status: DISCONTINUED | OUTPATIENT
Start: 2020-06-02 | End: 2020-06-02 | Stop reason: SURG

## 2020-06-02 RX ORDER — HYDROMORPHONE HYDROCHLORIDE 1 MG/ML
0.5 INJECTION, SOLUTION INTRAMUSCULAR; INTRAVENOUS; SUBCUTANEOUS
Status: DISCONTINUED | OUTPATIENT
Start: 2020-06-02 | End: 2020-06-02 | Stop reason: HOSPADM

## 2020-06-02 RX ORDER — FELODIPINE 5 MG/1
10 TABLET, EXTENDED RELEASE ORAL DAILY
Status: DISCONTINUED | OUTPATIENT
Start: 2020-06-02 | End: 2020-06-04 | Stop reason: HOSPADM

## 2020-06-02 RX ORDER — SODIUM CHLORIDE 0.9 % (FLUSH) 0.9 %
10 SYRINGE (ML) INJECTION EVERY 12 HOURS SCHEDULED
Status: DISCONTINUED | OUTPATIENT
Start: 2020-06-02 | End: 2020-06-02 | Stop reason: HOSPADM

## 2020-06-02 RX ORDER — LIDOCAINE HYDROCHLORIDE AND EPINEPHRINE 5; 5 MG/ML; UG/ML
INJECTION, SOLUTION INFILTRATION; PERINEURAL AS NEEDED
Status: DISCONTINUED | OUTPATIENT
Start: 2020-06-02 | End: 2020-06-02 | Stop reason: HOSPADM

## 2020-06-02 RX ORDER — SODIUM CHLORIDE 0.9 % (FLUSH) 0.9 %
10 SYRINGE (ML) INJECTION AS NEEDED
Status: DISCONTINUED | OUTPATIENT
Start: 2020-06-02 | End: 2020-06-02 | Stop reason: HOSPADM

## 2020-06-02 RX ORDER — DEXAMETHASONE SODIUM PHOSPHATE 4 MG/ML
INJECTION, SOLUTION INTRA-ARTICULAR; INTRALESIONAL; INTRAMUSCULAR; INTRAVENOUS; SOFT TISSUE AS NEEDED
Status: DISCONTINUED | OUTPATIENT
Start: 2020-06-02 | End: 2020-06-02 | Stop reason: SURG

## 2020-06-02 RX ORDER — CEFAZOLIN SODIUM 2 G/100ML
2 INJECTION, SOLUTION INTRAVENOUS EVERY 8 HOURS
Status: COMPLETED | OUTPATIENT
Start: 2020-06-02 | End: 2020-06-03

## 2020-06-02 RX ORDER — NEOSTIGMINE METHYLSULFATE 1 MG/ML
INJECTION, SOLUTION INTRAVENOUS AS NEEDED
Status: DISCONTINUED | OUTPATIENT
Start: 2020-06-02 | End: 2020-06-02 | Stop reason: SURG

## 2020-06-02 RX ORDER — SODIUM CHLORIDE, SODIUM LACTATE, POTASSIUM CHLORIDE, CALCIUM CHLORIDE 600; 310; 30; 20 MG/100ML; MG/100ML; MG/100ML; MG/100ML
9 INJECTION, SOLUTION INTRAVENOUS CONTINUOUS
Status: DISCONTINUED | OUTPATIENT
Start: 2020-06-02 | End: 2020-06-04 | Stop reason: HOSPADM

## 2020-06-02 RX ORDER — FAMOTIDINE 20 MG/1
20 TABLET, FILM COATED ORAL ONCE
Status: COMPLETED | OUTPATIENT
Start: 2020-06-02 | End: 2020-06-02

## 2020-06-02 RX ORDER — LIDOCAINE HYDROCHLORIDE 10 MG/ML
0.5 INJECTION, SOLUTION EPIDURAL; INFILTRATION; INTRACAUDAL; PERINEURAL ONCE AS NEEDED
Status: DISCONTINUED | OUTPATIENT
Start: 2020-06-02 | End: 2020-06-02 | Stop reason: HOSPADM

## 2020-06-02 RX ORDER — ACETAMINOPHEN 325 MG/1
650 TABLET ORAL EVERY 4 HOURS PRN
Status: DISCONTINUED | OUTPATIENT
Start: 2020-06-02 | End: 2020-06-04 | Stop reason: HOSPADM

## 2020-06-02 RX ORDER — HYDROMORPHONE HYDROCHLORIDE 1 MG/ML
0.5 INJECTION, SOLUTION INTRAMUSCULAR; INTRAVENOUS; SUBCUTANEOUS
Status: DISCONTINUED | OUTPATIENT
Start: 2020-06-02 | End: 2020-06-04 | Stop reason: HOSPADM

## 2020-06-02 RX ORDER — FENTANYL CITRATE 50 UG/ML
INJECTION, SOLUTION INTRAMUSCULAR; INTRAVENOUS AS NEEDED
Status: DISCONTINUED | OUTPATIENT
Start: 2020-06-02 | End: 2020-06-02 | Stop reason: SURG

## 2020-06-02 RX ORDER — IBUPROFEN 800 MG/1
800 TABLET ORAL ONCE
Status: DISCONTINUED | OUTPATIENT
Start: 2020-06-02 | End: 2020-06-02 | Stop reason: HOSPADM

## 2020-06-02 RX ORDER — LIDOCAINE HYDROCHLORIDE 10 MG/ML
INJECTION, SOLUTION EPIDURAL; INFILTRATION; INTRACAUDAL; PERINEURAL AS NEEDED
Status: DISCONTINUED | OUTPATIENT
Start: 2020-06-02 | End: 2020-06-02 | Stop reason: SURG

## 2020-06-02 RX ORDER — HYDROCODONE BITARTRATE AND ACETAMINOPHEN 7.5; 325 MG/1; MG/1
1 TABLET ORAL ONCE
Status: COMPLETED | OUTPATIENT
Start: 2020-06-02 | End: 2020-06-02

## 2020-06-02 RX ORDER — SODIUM CHLORIDE 9 MG/ML
9 INJECTION, SOLUTION INTRAVENOUS ONCE
Status: DISCONTINUED | OUTPATIENT
Start: 2020-06-02 | End: 2020-06-02 | Stop reason: HOSPADM

## 2020-06-02 RX ORDER — FENTANYL CITRATE 50 UG/ML
50 INJECTION, SOLUTION INTRAMUSCULAR; INTRAVENOUS
Status: DISCONTINUED | OUTPATIENT
Start: 2020-06-02 | End: 2020-06-02 | Stop reason: HOSPADM

## 2020-06-02 RX ORDER — TAMSULOSIN HYDROCHLORIDE 0.4 MG/1
0.4 CAPSULE ORAL NIGHTLY
Status: DISCONTINUED | OUTPATIENT
Start: 2020-06-02 | End: 2020-06-02

## 2020-06-02 RX ORDER — ONDANSETRON 2 MG/ML
INJECTION INTRAMUSCULAR; INTRAVENOUS AS NEEDED
Status: DISCONTINUED | OUTPATIENT
Start: 2020-06-02 | End: 2020-06-02 | Stop reason: SURG

## 2020-06-02 RX ORDER — ROCURONIUM BROMIDE 10 MG/ML
INJECTION, SOLUTION INTRAVENOUS AS NEEDED
Status: DISCONTINUED | OUTPATIENT
Start: 2020-06-02 | End: 2020-06-02 | Stop reason: SURG

## 2020-06-02 RX ORDER — GLYCOPYRROLATE 0.2 MG/ML
INJECTION INTRAMUSCULAR; INTRAVENOUS AS NEEDED
Status: DISCONTINUED | OUTPATIENT
Start: 2020-06-02 | End: 2020-06-02 | Stop reason: SURG

## 2020-06-02 RX ORDER — CEFAZOLIN SODIUM 2 G/100ML
2 INJECTION, SOLUTION INTRAVENOUS ONCE
Status: COMPLETED | OUTPATIENT
Start: 2020-06-02 | End: 2020-06-02

## 2020-06-02 RX ORDER — MAGNESIUM HYDROXIDE 1200 MG/15ML
LIQUID ORAL AS NEEDED
Status: DISCONTINUED | OUTPATIENT
Start: 2020-06-02 | End: 2020-06-02 | Stop reason: HOSPADM

## 2020-06-02 RX ORDER — SODIUM CHLORIDE 0.9 % (FLUSH) 0.9 %
3 SYRINGE (ML) INJECTION EVERY 12 HOURS SCHEDULED
Status: DISCONTINUED | OUTPATIENT
Start: 2020-06-02 | End: 2020-06-04 | Stop reason: HOSPADM

## 2020-06-02 RX ADMIN — METHOCARBAMOL 750 MG: 750 TABLET ORAL at 20:25

## 2020-06-02 RX ADMIN — SODIUM CHLORIDE, POTASSIUM CHLORIDE, SODIUM LACTATE AND CALCIUM CHLORIDE: 600; 310; 30; 20 INJECTION, SOLUTION INTRAVENOUS at 10:49

## 2020-06-02 RX ADMIN — FENTANYL CITRATE 50 MCG: 50 INJECTION, SOLUTION INTRAMUSCULAR; INTRAVENOUS at 11:04

## 2020-06-02 RX ADMIN — FENTANYL CITRATE 25 MCG: 50 INJECTION, SOLUTION INTRAMUSCULAR; INTRAVENOUS at 13:37

## 2020-06-02 RX ADMIN — FELODIPINE 10 MG: 5 TABLET, FILM COATED, EXTENDED RELEASE ORAL at 23:38

## 2020-06-02 RX ADMIN — EPHEDRINE SULFATE 10 MG: 50 INJECTION INTRAMUSCULAR; INTRAVENOUS; SUBCUTANEOUS at 11:36

## 2020-06-02 RX ADMIN — POTASSIUM CHLORIDE AND SODIUM CHLORIDE 100 ML/HR: 900; 150 INJECTION, SOLUTION INTRAVENOUS at 14:57

## 2020-06-02 RX ADMIN — ROCURONIUM BROMIDE 50 MG: 10 INJECTION INTRAVENOUS at 11:04

## 2020-06-02 RX ADMIN — ONDANSETRON 4 MG: 2 INJECTION INTRAMUSCULAR; INTRAVENOUS at 13:13

## 2020-06-02 RX ADMIN — SODIUM CHLORIDE, PRESERVATIVE FREE 3 ML: 5 INJECTION INTRAVENOUS at 20:25

## 2020-06-02 RX ADMIN — FAMOTIDINE 20 MG: 20 TABLET, FILM COATED ORAL at 09:43

## 2020-06-02 RX ADMIN — DEXAMETHASONE SODIUM PHOSPHATE 8 MG: 4 INJECTION, SOLUTION INTRAMUSCULAR; INTRAVENOUS at 11:15

## 2020-06-02 RX ADMIN — GLYCOPYRROLATE 0.4 MG: 0.2 INJECTION INTRAMUSCULAR; INTRAVENOUS at 13:30

## 2020-06-02 RX ADMIN — FENTANYL CITRATE 25 MCG: 50 INJECTION, SOLUTION INTRAMUSCULAR; INTRAVENOUS at 12:51

## 2020-06-02 RX ADMIN — METHOCARBAMOL 750 MG: 750 TABLET ORAL at 15:26

## 2020-06-02 RX ADMIN — ACETAMINOPHEN 650 MG: 325 TABLET ORAL at 09:43

## 2020-06-02 RX ADMIN — EPHEDRINE SULFATE 10 MG: 50 INJECTION INTRAMUSCULAR; INTRAVENOUS; SUBCUTANEOUS at 11:20

## 2020-06-02 RX ADMIN — DEXAMETHASONE SODIUM PHOSPHATE 8 MG: 4 INJECTION, SOLUTION INTRAMUSCULAR; INTRAVENOUS at 11:08

## 2020-06-02 RX ADMIN — EPHEDRINE SULFATE 10 MG: 50 INJECTION INTRAMUSCULAR; INTRAVENOUS; SUBCUTANEOUS at 12:01

## 2020-06-02 RX ADMIN — CEFAZOLIN SODIUM 2 G: 2 INJECTION, SOLUTION INTRAVENOUS at 20:25

## 2020-06-02 RX ADMIN — LIDOCAINE HYDROCHLORIDE 50 MG: 10 INJECTION, SOLUTION EPIDURAL; INFILTRATION; INTRACAUDAL; PERINEURAL at 11:04

## 2020-06-02 RX ADMIN — HYDROCODONE BITARTRATE AND ACETAMINOPHEN 1 TABLET: 7.5; 325 TABLET ORAL at 09:43

## 2020-06-02 RX ADMIN — CEFAZOLIN SODIUM 2 G: 2 INJECTION, SOLUTION INTRAVENOUS at 11:00

## 2020-06-02 RX ADMIN — LATANOPROST 1 DROP: 50 SOLUTION OPHTHALMIC at 20:25

## 2020-06-02 RX ADMIN — PROPOFOL 200 MG: 10 INJECTION, EMULSION INTRAVENOUS at 11:04

## 2020-06-02 RX ADMIN — NEOSTIGMINE 3 MG: 1 INJECTION INTRAVENOUS at 13:30

## 2020-06-02 NOTE — ANESTHESIA PREPROCEDURE EVALUATION
Anesthesia Evaluation     Patient summary reviewed and Nursing notes reviewed   no history of anesthetic complications:  NPO Solid Status: > 8 hours  NPO Liquid Status: > 8 hours           Airway   Mallampati: II  TM distance: >3 FB  Neck ROM: full  No difficulty expected  Dental      Pulmonary - negative pulmonary ROS and normal exam   Cardiovascular - normal exam    (+) hypertension, CAD, hyperlipidemia,       Neuro/Psych  (+) numbness,     GI/Hepatic/Renal/Endo      Musculoskeletal     Abdominal    Substance History      OB/GYN          Other      history of cancer                    Anesthesia Plan    ASA 2     general     intravenous induction     Anesthetic plan, all risks, benefits, and alternatives have been provided, discussed and informed consent has been obtained with: patient.    Plan discussed with CRNA.

## 2020-06-02 NOTE — INTERVAL H&P NOTE
Pre-Op H&P (See Recent Office Note Attached for Full H&P)    Chief complaint: Low back pain into LLE    Review of Systems:  General ROS:  no fever, chills, rashes.  No change since last office visit.  No recent sick exposure  Cardiovascular ROS: no chest pain or dyspnea on exertion.  History of CAD  Respiratory ROS: no cough, shortness of breath, or wheezing  :  History of CRI, follows with PCP.  Last Cr 2.5 per pt report    Meds:    No current facility-administered medications on file prior to encounter.      Current Outpatient Medications on File Prior to Encounter   Medication Sig Dispense Refill   • aspirin 81 MG EC tablet Take 81 mg by mouth Daily.     • atorvastatin (LIPITOR) 20 MG tablet Take 20 mg by mouth Daily.     • bimatoprost (LUMIGAN) 0.01 % ophthalmic drops Administer 1 drop to both eyes Every Night.     • chlorhexidine (HIBICLENS) 4 % external liquid Shower each day with solution for 5 days beginning 5 days before surgery. 120 mL 0   • felodipine (PLENDIL) 10 MG 24 hr tablet Take 10 mg by mouth Daily.     • Multiple Vitamins-Minerals (MULTIVITAMIN WITH MINERALS) tablet tablet Take 1 tablet by mouth Daily.     • tamsulosin (FLOMAX) 0.4 MG capsule 24 hr capsule Take 1 capsule by mouth Every Night.     • olopatadine (PATANASE) 0.6 % solution nasal solution 2 sprays by Each Nare route Daily.         Vital Signs:  /99 (BP Location: Right arm, Patient Position: Lying)   Pulse 69   Temp 96.9 °F (36.1 °C) (Temporal)   Resp 18   SpO2 98%     Physical Exam:    CV:  S1S2 regular rate and rhythm, no murmur               Resp:  Clear to auscultation; respirations regular, even and unlabored    Results Review:     Lab Results   Component Value Date    WBC 4.05 05/31/2020    HGB 14.9 05/31/2020    HCT 45.6 05/31/2020    MCV 98.7 (H) 05/31/2020     05/31/2020    GLUCOSE 122 (H) 05/31/2020    BUN 31 (H) 05/31/2020    CREATININE 2.41 (H) 05/31/2020    EGFRIFNONA 26 (L) 05/31/2020      05/31/2020    K 4.9 05/31/2020     05/31/2020    CO2 26.0 05/31/2020    CALCIUM 10.0 05/31/2020        I reviewed the patient's new clinical results.    Cancer Staging (if applicable)  Cancer Patient: __ yes _x_no __unknown; If yes, clinical stage T:__ N:__M:__, stage group or __N/A    Assessment/Plan:    Mr. Gunderson is a 79 y.o. male     This gentleman presents with some advanced age but is otherwise very active he presents with neurogenic claudication referrable to left-sided likely L4-5 synovial cyst area he has bilateral high-grade stenosis lumbar flexion-extension films are normal hip x-rays look good    I did talk to them about the options from my standpoint I think a unilateral approach L51, L4, L3 hemilaminotomy from the left approach would be reasonable option we can undercut bilaterally plus or minus completion L4 laminectomy I explained the risk benefits and expected outcome    Shagufta Taylor, LILLI  6/2/2020   09:34

## 2020-06-02 NOTE — OP NOTE
Operation note      Preoperative diagnosis   Left-sided hip pain neurogenic claudication spinal stenosis L3-4 L4-5 L5 1      Postoperative diagnosis same    Procedures performed left-sided  1.  Left-sided lamina foraminotomies/hemilaminectomy L5, decompression L5-S1  Interspace  2.  L4 complete laminectomy bilateral partial medial facetectomy at L4-5 with foraminotomies    3.  Undercutting L3 partial bilateral lamina foraminotomies L3-4    4.  Placement of a drain  Surgeon: Marcell Barrow MD     Assistants: Jake Camarena    Anesthesia: Normal endotracheal anesthesia    ASA Class: 3  Significant lateral recess stenosis  Complications none    10 cc blood loss microscope used    Procedure in detail after formal written consent was obtained the patient was taken to the operating room endotracheally intubated given preoperative antimicrobial prophylaxis they were prepped and draped in the usual sterile manner all bony prominences and genitalia were padded to prevent neurologic injury.    At this point in time the patient was given local anesthesia at the operative level fluoroscopic guidance confirmed as 45 the correct level and retraction system was placed on the lamina facet complex to ensure adequate exposure.  Attention was first turned to the left L5-S1 area where completion lamina foraminotomy and removal of thickened ligament was removed all the way up hemilaminectomy and L5 was performed all the way up to the 4 5 disc space      After adequate decompression, complete L4 laminectomy with bilateral foraminotomies L4-5 was performed removing dense adherent synovium and arthritic ligament that was causing severe central stenosis this was performed bilaterally at this point in time the cephalad margin of the L4 bone was removed and undercutting of the L3 ligament with bilateral foraminal decompression being performed as well until a Rush ball could pass easily in the L3-L4 and L5 exiting nerve roots.  The L5  lamina on the right was maintained intact with his appropriate muscular attachments    Meticulous hemostasis maintained with FloSeal at the resolution the case there is no evidence of obvious CSF leak the paraspinal musculature was closed a drain was left 7 flat TARAS    At the resolution the case meticulous hemostasis was maintained and in the incision was closed in layers I was present personally performed the entirety of the procedure until the skin closure.

## 2020-06-02 NOTE — ANESTHESIA POSTPROCEDURE EVALUATION
Patient: Frank Gunderson    Procedure Summary     Date:  06/02/20 Room / Location:   [facility]  /  [facility]    Anesthesia Start:  1100 Anesthesia Stop:      Procedure:  HEMILAMINECTOMY  L3-4 POSIBLE L4 (Left Spine Lumbar) Diagnosis:       Lumbago      (Lumbago [M54.5])    Surgeon:  Marcell Barrow MD Provider:  Saw Gonzalez MD    Anesthesia Type:  general ASA Status:  2          Anesthesia Type: general    Vitals  Vitals Value Taken Time   /76 6/2/2020  1:38 PM   Temp 97 °F (36.1 °C) 6/2/2020  1:38 PM   Pulse 82 6/2/2020  1:42 PM   Resp 15 6/2/2020  1:38 PM   SpO2 96 % 6/2/2020  1:42 PM   Vitals shown include unvalidated device data.        Anesthesia Post Evaluation

## 2020-06-03 PROCEDURE — 63710000001 TAMSULOSIN 0.4 MG CAPSULE: Performed by: NEUROLOGICAL SURGERY

## 2020-06-03 PROCEDURE — A9270 NON-COVERED ITEM OR SERVICE: HCPCS | Performed by: NEUROLOGICAL SURGERY

## 2020-06-03 PROCEDURE — 25810000003 SODIUM CHLORIDE 0.9 % WITH KCL 20 MEQ 20-0.9 MEQ/L-% SOLUTION: Performed by: NEUROLOGICAL SURGERY

## 2020-06-03 PROCEDURE — 63710000001 METHOCARBAMOL 750 MG TABLET: Performed by: NEUROLOGICAL SURGERY

## 2020-06-03 PROCEDURE — 99024 POSTOP FOLLOW-UP VISIT: CPT | Performed by: PHYSICIAN ASSISTANT

## 2020-06-03 PROCEDURE — 63710000001 FELODIPINE 5 MG TABLET SUSTAINED-RELEASE 24 HOUR: Performed by: NEUROLOGICAL SURGERY

## 2020-06-03 PROCEDURE — 25010000003 CEFAZOLIN IN DEXTROSE 2-4 GM/100ML-% SOLUTION: Performed by: NEUROLOGICAL SURGERY

## 2020-06-03 RX ORDER — METHOCARBAMOL 750 MG/1
750 TABLET, FILM COATED ORAL EVERY 8 HOURS SCHEDULED
Qty: 60 TABLET | Refills: 3 | Status: SHIPPED | OUTPATIENT
Start: 2020-06-03 | End: 2020-06-18

## 2020-06-03 RX ORDER — HYDROCODONE BITARTRATE AND ACETAMINOPHEN 7.5; 325 MG/1; MG/1
1 TABLET ORAL EVERY 4 HOURS PRN
Qty: 40 TABLET | Refills: 0
Start: 2020-06-03 | End: 2020-06-12

## 2020-06-03 RX ADMIN — METHOCARBAMOL 750 MG: 750 TABLET ORAL at 13:23

## 2020-06-03 RX ADMIN — POTASSIUM CHLORIDE AND SODIUM CHLORIDE 100 ML/HR: 900; 150 INJECTION, SOLUTION INTRAVENOUS at 01:38

## 2020-06-03 RX ADMIN — TAMSULOSIN HYDROCHLORIDE 0.4 MG: 0.4 CAPSULE ORAL at 08:06

## 2020-06-03 RX ADMIN — FELODIPINE 10 MG: 5 TABLET, FILM COATED, EXTENDED RELEASE ORAL at 21:07

## 2020-06-03 RX ADMIN — METHOCARBAMOL 750 MG: 750 TABLET ORAL at 06:01

## 2020-06-03 RX ADMIN — CEFAZOLIN SODIUM 2 G: 2 INJECTION, SOLUTION INTRAVENOUS at 04:12

## 2020-06-03 RX ADMIN — SODIUM CHLORIDE, PRESERVATIVE FREE 3 ML: 5 INJECTION INTRAVENOUS at 08:08

## 2020-06-03 RX ADMIN — SODIUM CHLORIDE, PRESERVATIVE FREE 3 ML: 5 INJECTION INTRAVENOUS at 21:08

## 2020-06-03 NOTE — PLAN OF CARE
Problem: Patient Care Overview  Goal: Plan of Care Review  Flowsheets  Taken 6/3/2020 4503  Progress: improving  Outcome Summary: Pt has done well during night shift, c/o incisional pain, declined offered pain meds.  Ambulated in hyatt x 2.  Voids without problems per pt (declined bladder scan).  TARAS drain with approx 105 ml output.  Pt refused SCDs.  Pt alert, oriented.  Anticipating discharge on Wed.  VSS  Taken 6/2/2020 1945  Plan of Care Reviewed With: patient     Problem: Laminectomy/Foraminotomy/Discectomy (Adult)  Goal: Signs and Symptoms of Listed Potential Problems Will be Absent, Minimized or Managed (Laminectomy/Foraminotomy/Discectomy)  Flowsheets (Taken 6/3/2020 4719)  Problems Assessed (Laminectomy/Laminotomy/Discectomy): all  Problems Present (Laminectomy/otomy): none

## 2020-06-03 NOTE — PROGRESS NOTES
Discharge Planning Assessment  [facility]     Patient Name: Frank Gunderson  MRN: [id number]  Today's Date: 6/3/2020    Admit Date: 6/2/2020    Discharge Needs Assessment     Row Name 06/03/20 0915       Living Environment    Lives With  parent(s)    Name(s) of Who Lives With Patient  Lo Gunderson - spouse     Current Living Arrangements  home/apartment/condo    Primary Care Provided by  self    Provides Primary Care For  no one    Family Caregiver if Needed  spouse    Family Caregiver Names  Lo Gunderson - spouse    Quality of Family Relationships  helpful;involved;supportive    Able to Return to Prior Arrangements  yes       Resource/Environmental Concerns    Resource/Environmental Concerns  none    Transportation Concerns  car, none       Transition Planning    Patient/Family Anticipates Transition to  home with family    Patient/Family Anticipated Services at Transition  none    Transportation Anticipated  family or friend will provide       Discharge Needs Assessment    Readmission Within the Last 30 Days  no previous admission in last 30 days    Concerns to be Addressed  denies needs/concerns at this time    Equipment Currently Used at Home  none    Anticipated Changes Related to Illness  none    Equipment Needed After Discharge  none        Discharge Plan     Row Name 06/03/20 0916       Plan    Plan  Home    Plan Comments  CM spoke with patient at bedside. Patient states he resides with his spouse, Lo in [facility]. Patient states he is independent with ADL's. Patient denies any DME. Patient denies any current HH or outpatient services. Patient does not anticipate any discharge planning needs at this time. CM will continue to follow.    Final Discharge Disposition Code  01 - home or self-care                Expected Discharge Date and Time     Expected Discharge Date Expected Discharge Time    Daniel 3, 2020         Demographic Summary     Row Name 06/03/20 0913       General Information     Admission Type  observation    Arrived From  home    Referral Source  physician    Reason for Consult  discharge planning    Preferred Language  English     Used During This Interaction  no    General Information Comments  PCP: Norman Gomez       Contact Information    Contact Information Comments  Spouse - Lo         Functional Status     Row Name 06/03/20 0914       Functional Status    Usual Activity Tolerance  good    Current Activity Tolerance  good       Functional Status, IADL    Medications  independent    Meal Preparation  independent    Housekeeping  independent    Laundry  independent    Shopping  independent       Mental Status    General Appearance WDL  WDL       Mental Status Summary    Recent Changes in Mental Status/Cognitive Functioning  no changes       Employment/    Employment Status  retired    Employment/ Comments  Patient states he is able to afford/obtain his medications without difficulty                Beba Leon

## 2020-06-03 NOTE — PROGRESS NOTES
HOD# : 0    No events last night  Patient had about 135 mL out of incision overnight.  Patient has had over 100 since seeing him this morning.  Patient will Stay for 1 more night and be discharged in the a.m. once drain is removed.    Patient is doing well and denies any lower extremity pain especially when walking.  Patient has only lower back discomfort from the laminectomy which is reasonable.    Lumbago      Temp:  [97.5 °F (36.4 °C)-98 °F (36.7 °C)] 98 °F (36.7 °C)  Heart Rate:  [56-70] 70  Resp:  [16] 16  BP: (132-161)/(64-94) 161/85  I/O last 3 completed shifts:  In: 1980 [I.V.:1980]  Out: 1355 [Urine:1100; Drains:230; Blood:25]  I/O this shift:  In: 720 [P.O.:720]  Out: 500 [Urine:400; Drains:100]  Vital signs were reviewed and documented in the chart      EXAM   There is no height or weight on file to calculate BMI.      Patient appeared in good neurologic function with normal comprehension   CN grossly intact  Moves all extremities to command  Incision clean dry no signs infection bleeding erythema.  Drain is putting out blood-tinged drainage but seems to be slowing.    DIAGNOSIS  1. Lumbago          PLAN    Check on drain output in the morning.  Hopefully discharge in the a.m.

## 2020-06-03 NOTE — PLAN OF CARE
Problem: Patient Care Overview  Goal: Plan of Care Review  Outcome: Ongoing (interventions implemented as appropriate)  Flowsheets (Taken 6/3/2020 9230)  Progress: improving  Note:   Pt alert and oriented, cooperative. Ambulating well, lumbar dressing with small amount of drainage, TARAS remains in place. Plans for dc tomorrow. VSS

## 2020-06-04 VITALS
DIASTOLIC BLOOD PRESSURE: 76 MMHG | TEMPERATURE: 98 F | SYSTOLIC BLOOD PRESSURE: 127 MMHG | HEART RATE: 67 BPM | RESPIRATION RATE: 16 BRPM | OXYGEN SATURATION: 91 %

## 2020-06-04 PROCEDURE — 99024 POSTOP FOLLOW-UP VISIT: CPT | Performed by: NEUROLOGICAL SURGERY

## 2020-06-04 PROCEDURE — 99024 POSTOP FOLLOW-UP VISIT: CPT | Performed by: PHYSICIAN ASSISTANT

## 2020-06-04 RX ORDER — LIDOCAINE HYDROCHLORIDE 10 MG/ML
INJECTION, SOLUTION EPIDURAL; INFILTRATION; INTRACAUDAL; PERINEURAL
Status: DISCONTINUED
Start: 2020-06-04 | End: 2020-06-04 | Stop reason: HOSPADM

## 2020-06-04 RX ADMIN — SODIUM CHLORIDE, PRESERVATIVE FREE 3 ML: 5 INJECTION INTRAVENOUS at 08:32

## 2020-06-04 NOTE — PLAN OF CARE
Pt rested well this shift.  No c/o pain.  Refused scheduled robaxin. Lumbar dressing stained. Faizan with 150cc's out total at this time.  Ambulating with assist.  Vs noted. Cont to monitor

## 2020-06-04 NOTE — DISCHARGE SUMMARY
[facility]    Date of Admission: 6/2/2020  Date of Discharge:  6/4/2020    Discharge Diagnosis: Lumbar stenosis with neurogenic claudication    Procedures Performed  Procedure(s):  HEMILAMINECTOMY  L3-4       History of Present Illness:  The patient is a 79 y.o. male who is well-known to the neurosurgical practice for undergoing C2-3 posterior cervical laminectomy by Dr. Barrow on 2/5/2020.  He had known lumbar stenosis with most recent MRI demonstrating significant disease at L3-4, L4-5 left, L5-S1.  He was deemed a surgical candidate by Dr. Barrow and as such underwent decompression on 6/2/2020.    Hospital Course:   Postoperative day #1 patient was doing well pain in his back well controlled.  TARAS drain output was approximately 250 mL on the first postoperative day.  It was recommended he stay an additional night to assure drain output was diminishing.  Postoperative day #2 drain output slowed.  At this time TARAS drain was removed and drain site sutured without issue.  He was stable for discharge home. Over the course of his hospital stay, vitals remained stable and lumbar incision site remained clean, dry and intact. Motor and sensory function were found to be intact throughout. He was ambulatory, voiding independently, and tolerating oral intake without associated nausea or vomiting. Patient was doing well at the time of discharge on 6/4/2020.    Condition on Discharge:  Stable  Discharge to: Home    Plan:  Follow-up will be with Jake Camarena PA-C in 2 weeks  Wound care and restrictions were discussed with patient.  He was instructed to contact us if he were to develop any increased incisional drainage or positional headache.      Discharge Medications     Discharge Medications      New Medications      Instructions Start Date   HYDROcodone-acetaminophen 7.5-325 MG per tablet  Commonly known as:  NORCO   1 tablet, Oral, Every 4 Hours PRN      methocarbamol  750 MG tablet  Commonly known as:  ROBAXIN   750 mg, Oral, Every 8 Hours Scheduled         Continue These Medications      Instructions Start Date   aspirin 81 MG EC tablet   81 mg, Oral, Daily      atorvastatin 20 MG tablet  Commonly known as:  LIPITOR   20 mg, Oral, Daily      bimatoprost 0.01 % ophthalmic drops  Commonly known as:  LUMIGAN   1 drop, Both Eyes, Nightly      coenzyme Q10 50 MG capsule capsule   Oral, Daily      felodipine 10 MG 24 hr tablet  Commonly known as:  PLENDIL   10 mg, Oral, Daily      multivitamin with minerals tablet tablet   1 tablet, Oral, Daily      olopatadine 0.6 % solution nasal solution  Commonly known as:  PATANASE   2 sprays, Each Nare, Daily      tamsulosin 0.4 MG capsule 24 hr capsule  Commonly known as:  FLOMAX   1 capsule, Oral, Nightly      VITAMIN D PO   Oral, Daily      vitamin E 200 UNIT capsule   200 Units, Oral, Daily             Follow-up Appointments  No future appointments.      Referring Provider  Marcell Barrow MD    PCP   Norman Gomez DO Katie Roddy Lindsay, PA-C  06/04/20  09:48

## 2020-06-04 NOTE — PROGRESS NOTES
NEUROSURGERY PROGRESS NOTE    Interval History:   Postop day #2: S/p hemilaminectomy L3-4  TARAS output 150 ml output overnight shift  States pain is well controlled. Incisional pain as expected  Denies lower extremity pain, numbness or weakness  Ambulatory without issue    Vital Signs  Blood pressure 127/76, pulse 67, temperature 98 °F (36.7 °C), temperature source Oral, resp. rate 16, SpO2 91 %.    Physical Exam:  Patient is alert, oriented and in no acute distress  Moves lower extremities with good strength  Sensory function intact in lower extremities  Lumbar incision is intact with steri strips. The area is dry without active drainage. No erythema or fluctuance is noted  Blood-tinged drainage noted in TARAS     Results Review:    I/O last 3 completed shifts:  In: 720 [P.O.:720]  Out: 1355 [Urine:1000; Drains:355]  No intake/output data recorded.      Assessment/Plan:   TARAS drain removed and drain site sutured. Patient tolerated this well. No drainage noted from drain site upon removal. Stable for discharge home. Wound care and restrictions were discussed with patient.    Zelda Kemp PA-C  06/04/20  09:39

## 2020-06-04 NOTE — PROGRESS NOTES
HOD# : 0    No events last night      Lumbago      Temp:  [97.8 °F (36.6 °C)-98.2 °F (36.8 °C)] 98 °F (36.7 °C)  Heart Rate:  [51-70] 67  Resp:  [16] 16  BP: (127-170)/(75-98) 127/76  I/O last 3 completed shifts:  In: 720 [P.O.:720]  Out: 1355 [Urine:1000; Drains:355]  No intake/output data recorded.  Vital signs were reviewed and documented in the chart      EXAM   Patient appeared in good neurologic function with normal comprehension   CN grossly intact  Moves all extremities to command      Drain with mostly serous output  PLAN   Patient doing well status post laminectomy her pain is gone ambulating well expected soreness in back    Will DC drain today did not have any evidence of headaches no obvious spinal fluid leaks during time of surgery    Apprised risk and benefits of discharge plan will DC later today

## 2020-06-18 ENCOUNTER — OFFICE VISIT (OUTPATIENT)
Dept: NEUROSURGERY | Facility: CLINIC | Age: 80
End: 2020-06-18

## 2020-06-18 VITALS
DIASTOLIC BLOOD PRESSURE: 84 MMHG | HEIGHT: 74 IN | SYSTOLIC BLOOD PRESSURE: 142 MMHG | TEMPERATURE: 98 F | WEIGHT: 195 LBS | BODY MASS INDEX: 25.03 KG/M2

## 2020-06-18 DIAGNOSIS — M48.062 LUMBAR STENOSIS WITH NEUROGENIC CLAUDICATION: Primary | ICD-10-CM

## 2020-06-18 DIAGNOSIS — Z98.890 H/O EXCISION OF LAMINA OF LUMBAR VERTEBRA FOR DECOMPRESSION OF SPINAL CORD: ICD-10-CM

## 2020-06-18 PROCEDURE — 99024 POSTOP FOLLOW-UP VISIT: CPT | Performed by: PHYSICIAN ASSISTANT

## 2020-06-18 NOTE — PROGRESS NOTES
Subjective     Chief Complaint: Postoperative follow-up    Patient ID: Frank Gunderson is a 79 y.o. male is here today for follow-up.    History of Present Illness    This is a very nice 79-year-old gentleman who is well-known to our service with a history of cervical spondylitic myelopathy who underwent a decompressive cervical laminectomy and had excellent improvement in his preoperative symptoms.  He represented to our office with complaints consistent with neurogenic claudication.  Preoperative imaging demonstrated significant disc disease at L3-4, L4-5, and L5-S1.  Patient was deemed a surgical candidate and underwent uncomplicated lumbar decompression on 2020    Patient presents today in routine follow-up.  He reports significant improvement in his preoperative left hip pain.  He denies any lower extremity pain, numbness, weakness.  He is able to ambulate without pain.  He has occasional low back pain that is well controlled with anti-inflammatories and muscle relaxants.  He has not taken any narcotic pain medication.  He is very pleased with his postoperative results.  Denies any issues with his incision.    The following portions of the patient's history were reviewed and updated as appropriate: allergies, current medications, past family history, past medical history, past social history, past surgical history and problem list.    Family history:   Family History   Problem Relation Age of Onset   • Hypertension Father        Social history:   Social History     Socioeconomic History   • Marital status:      Spouse name: Not on file   • Number of children: Not on file   • Years of education: Not on file   • Highest education level: Not on file   Tobacco Use   • Smoking status: Former Smoker     Packs/day: 1.00     Years: 15.00     Pack years: 15.00     Last attempt to quit: 1970     Years since quittin.4   • Smokeless tobacco: Never Used   Substance and Sexual Activity   • Alcohol use: Yes      Alcohol/week: 14.0 standard drinks     Types: 14 Cans of beer per week     Frequency: Never   • Drug use: No   • Sexual activity: Defer       Review of Systems   Constitutional: Negative for activity change, appetite change, chills, diaphoresis, fatigue, fever and unexpected weight change.   HENT: Negative for congestion, dental problem, drooling, ear discharge, ear pain, facial swelling, hearing loss, mouth sores, nosebleeds, postnasal drip, rhinorrhea, sinus pressure, sneezing, sore throat, tinnitus, trouble swallowing and voice change.    Eyes: Negative for photophobia, pain, discharge, redness, itching and visual disturbance.   Respiratory: Negative for apnea, cough, choking, chest tightness, shortness of breath, wheezing and stridor.    Cardiovascular: Negative for chest pain, palpitations and leg swelling.   Gastrointestinal: Negative for abdominal distention, abdominal pain, anal bleeding, blood in stool, constipation, diarrhea, nausea, rectal pain and vomiting.   Endocrine: Negative for cold intolerance, heat intolerance, polydipsia, polyphagia and polyuria.   Genitourinary: Negative for decreased urine volume, difficulty urinating, dysuria, enuresis, flank pain, frequency, genital sores, hematuria and urgency.   Musculoskeletal: Positive for back pain. Negative for arthralgias, gait problem, joint swelling, myalgias, neck pain and neck stiffness.   Skin: Negative for color change, pallor, rash and wound.   Allergic/Immunologic: Negative for environmental allergies, food allergies and immunocompromised state.   Neurological: Negative for dizziness, tremors, seizures, syncope, facial asymmetry, speech difficulty, weakness, light-headedness, numbness and headaches.   Hematological: Negative for adenopathy. Does not bruise/bleed easily.   Psychiatric/Behavioral: Negative for agitation, behavioral problems, confusion, decreased concentration, dysphoric mood, hallucinations, self-injury, sleep disturbance and  "suicidal ideas. The patient is not nervous/anxious and is not hyperactive.    All other systems reviewed and are negative.      Objective   Blood pressure 142/84, temperature 98 °F (36.7 °C), temperature source Temporal, height 188 cm (74\"), weight 88.5 kg (195 lb).  Body mass index is 25.04 kg/m².  Patient's Body mass index is 25.04 kg/m². BMI is within normal parameters. No follow-up required..      Physical Exam   Constitutional: He is oriented to person, place, and time. He appears well-developed and well-nourished. No distress.   Pulmonary/Chest: Effort normal.   Neurological: He is alert and oriented to person, place, and time. He has normal strength. Gait normal. GCS eye subscore is 4. GCS verbal subscore is 5. GCS motor subscore is 6.   Skin: Skin is warm and dry. He is not diaphoretic. No erythema.        Psychiatric: He has a normal mood and affect. His behavior is normal.         Assessment/Plan       This is a an 79-year-old gentleman who is approximately two-weeks status post L4 laminectomy and L3, 4, 5 bilateral foraminotomies.  He has had near complete resolution of his preoperative left hip pain.  He is very pleased with his postoperative outcome.  I discussed slowly increasing his activity as well as limitations and restrictions moving forward.  He is interested in some outpatient physical therapy, I advised him that he may start this in the next few weeks.  I instructed him to call with new worsening symptoms.  Otherwise he will follow-up in 4 weeks.    Frank was seen today for post-op.    Diagnoses and all orders for this visit:    Lumbar stenosis with neurogenic claudication  -     Ambulatory Referral to Physical Therapy POST OP    H/O excision of lamina of lumbar vertebra for decompression of spinal cord        Return in about 4 weeks (around 7/16/2020), or if symptoms worsen or fail to improve.             Ania King PA-C        "

## 2020-07-06 ENCOUNTER — TREATMENT (OUTPATIENT)
Dept: PHYSICAL THERAPY | Facility: CLINIC | Age: 80
End: 2020-07-06

## 2020-07-06 DIAGNOSIS — M48.062 LUMBAR STENOSIS WITH NEUROGENIC CLAUDICATION: Primary | ICD-10-CM

## 2020-07-06 PROCEDURE — 97161 PT EVAL LOW COMPLEX 20 MIN: CPT | Performed by: PHYSICAL THERAPIST

## 2020-07-06 PROCEDURE — 97110 THERAPEUTIC EXERCISES: CPT | Performed by: PHYSICAL THERAPIST

## 2020-07-06 NOTE — PROGRESS NOTES
Physical Therapy Initial Evaluation and Plan of Care        Patient: Frank Gunderson   : 1940  Diagnosis/ICD-10 Code:  Lumbar stenosis with neurogenic claudication [M48.062]  Referring practitioner: Ania King PA-C  Date of Initial Visit: 2020  Today's Date: 2020  Patient seen for 1 sessions           Subjective Questionnaire: Oswestry: 12%      Subjective Evaluation    History of Present Illness  Date of surgery: 2020  Mechanism of injury: Prior to surgery patient reports walking intolerance d/t left lateral hip pain and failed pre-operative conservative management. He underwent L3/4 laminectomy last month and reports improved tolerance and continued improvement but he is still limited by onset of pain at about1 mile of walking.  He reports icing with some relief.  He notes minimal to no back pain but more activity dependent pain.  Patient reports resolution of symptoms within one minute of sitting.      Pain  Current pain ratin  At best pain ratin  At worst pain rating: 3  Quality: dull ache and tight  Relieving factors: ice and rest  Aggravating factors: overhead activity, ambulation and lifting  Progression: improved    Social Support  Lives with: spouse    Treatments  Previous treatment: physical therapy and medication  Patient Goals  Patient goals for therapy: decreased pain and return to sport/leisure activities             Objective          Postural Observations    Additional Postural Observation Details  Left shifted hips with right trunk lean, flat back appearance.    Tenderness     Additional Tenderness Details  Hypertrophic changes along the incision which is healing well without adverse signs.    TTP and guarding along the left piriformis and greater trochanter region. Minimal lumbar tenderness is noted.  Hypomobility throughout the lumbar spine.    Neurological Testing     Sensation     Lumbar   Left   Intact: light touch    Right   Intact: light touch    Reflexes    Left   Patellar (L4): normal (2+)  Achilles (S1): normal (2+)    Right   Patellar (L4): normal (2+)  Achilles (S1): normal (2+)    Active Range of Motion     Lumbar   Flexion: WFL  Extension: 5 (hinges at LS region) degrees with pain  Left rotation: WFL  Right rotation: WFL    Strength/Myotome Testing     Left Hip   Planes of Motion   Flexion: 4+  Extension: 4  Abduction: 4+    Isolated Muscles   Gluteus claudia: 3    Right Hip   Planes of Motion   Flexion: 4+  Extension: 3+  Abduction: 4+    Isolated Muscles   Gluteus maximums: 3    Left Knee   Flexion: 5  Extension: 5    Right Knee   Flexion: 5  Extension: 5    Left Ankle/Foot   Dorsiflexion: 5  Great toe extension: 5    Right Ankle/Foot   Dorsiflexion: 5  Great toe extension: 5    Tests     Lumbar     Left   Negative passive SLR.     Right   Negative passive SLR.     Left Pelvic Girdle/Sacrum   Negative: sacral spring.     Right Pelvic Girdle/Sacrum   Negative: sacral spring.     Ambulation     Comments   Right trunk lean with hips shifted left.  Increased right knee valgus.          Assessment & Plan     Assessment  Impairments: abnormal gait, abnormal or restricted ROM, activity intolerance, impaired physical strength, lacks appropriate home exercise program and pain with function  Assessment details: Patient presents s/p lumbar laminectomy 1 month ago.  He has experienced improvement in left hip pain with ambulation but continues to demonstrate left hip pain with prolonged standing or walking.  He is weak in hip extension and demonstrate a left hip shift posturally.  He will benefit from core and hip strengthening to improve his walking tolerance and recreational activities.  Prognosis: good  Functional Limitations: carrying objects, lifting, walking, pulling, pushing, uncomfortable because of pain, moving in bed, standing, stooping and unable to perform repetitive tasks  Goals  Plan Goals: Short Term Goals (2 weeks)  1. Patient to be compliant with initial  HEP  2.  Patient to report pain less than or equal to 2/10 post walking program.  3.  Patient to report decreased pain with trunk extension.    Long Term Goals (4 weeks)  1. Patient to demonstrate lumbar extension ROM to at least 15 degrees lumbar spine..  2. Patient to demonstrate independence with home exercises.  3. Patient to walk at least 2 miles without onset of left hip pain.  4. Patient to improve Modified Oswestry score to at least 6%.      Plan  Therapy options: will be seen for skilled physical therapy services  Planned modality interventions: electrical stimulation/Russian stimulation, TENS and ultrasound  Planned therapy interventions: manual therapy, therapeutic activities, postural training, body mechanics training, functional ROM exercises, flexibility, gait training, stretching, strengthening, joint mobilization, neuromuscular re-education, soft tissue mobilization and spinal/joint mobilization  Frequency: 1x week  Duration in visits: 6  Treatment plan discussed with: patient  Plan details: 1x/wk x 6 weeks for progression of hip and core stabilization, trunk mobility, manual and modalities as indicated based on pain.        Timed:  Manual Therapy:    0     mins  55231;  Therapeutic Exercise:    10     mins  53327;     Neuromuscular Belle:    0    mins  19983;    Therapeutic Activity:     0     mins  67950;     Gait Trainin     mins  60218;     Ultrasound:     0     mins  47565;    Electrical Stimulation:    0     mins  20266 ( );    Untimed:  Electrical Stimulation:    0     mins  60756 ( );  Mechanical Traction:    0     mins  93949;     Timed Treatment:   10   mins   Total Treatment:     40   mins    PT SIGNATURE: Kj Diop, PT   DATE TREATMENT INITIATED: 2020    Medicare Initial Certification  Certification Period: 10/4/2020  I certify that the therapy services are furnished while this patient is under my care.  The services outlined above are required by this  patient, and will be reviewed every 90 days.     PHYSICIAN: Ania King PA-C      DATE:     Please sign and return via fax to [phone].. Thank you, [facility].

## 2020-07-13 ENCOUNTER — OFFICE VISIT (OUTPATIENT)
Dept: NEUROSURGERY | Facility: CLINIC | Age: 80
End: 2020-07-13

## 2020-07-13 VITALS — BODY MASS INDEX: 24.85 KG/M2 | HEIGHT: 74 IN | WEIGHT: 193.6 LBS

## 2020-07-13 DIAGNOSIS — M48.02 SPINAL STENOSIS OF CERVICAL REGION: ICD-10-CM

## 2020-07-13 DIAGNOSIS — M54.12 CHRONIC CERVICAL RADICULOPATHY: Primary | ICD-10-CM

## 2020-07-13 DIAGNOSIS — M48.062 LUMBAR STENOSIS WITH NEUROGENIC CLAUDICATION: ICD-10-CM

## 2020-07-13 PROCEDURE — 99024 POSTOP FOLLOW-UP VISIT: CPT | Performed by: PHYSICIAN ASSISTANT

## 2020-07-13 NOTE — PROGRESS NOTES
Patient: Frank Gunderson  : 1940    Primary Care Provider: Norman Gomez DO      Chief Complaint: None today    History of Present Illness:       Patient is a very nice 79-year-old gentleman who is well-known to us for cervical myelopathy requiring a posterior cervical decompression.  Patient also underwent a L3-4 hemilaminectomy for neurogenic claudication which was his initial complaint and problem.  That procedure was performed on 2020.  Procedure went without event and patient is doing fantastic.  Patient has only mild low back pain intermittently and has no signs or symptoms of cervical myelopathy.  Patient has no neurogenic claudication and no pain in his legs when he walks.    Patient has started physical therapy and is doing well with this and is working on core strength.    Review of Systems   Constitutional: Negative.    HENT: Negative.    Eyes: Negative.    Respiratory: Negative.    Cardiovascular: Negative.    Gastrointestinal: Negative.    Endocrine: Negative.    Genitourinary: Negative.    Musculoskeletal: Positive for back pain.   Skin: Negative.    Allergic/Immunologic: Negative.    Neurological: Negative.    Hematological: Negative.    Psychiatric/Behavioral: Negative.    All other systems reviewed and are negative.      Past Medical History:     Past Medical History:   Diagnosis Date   • Cancer (CMS/HCC)     Prostate ( seeding)    • Chronic renal insufficiency    • Coronary artery disease     1 2018   • Glaucoma    • Heart disease    • Hyperlipidemia    • Hypertension    • Low back pain    • Skin cancer     nonmelaonma    • Spinal stenosis     cervical and lumbar    • Wears glasses        Family History:     Family History   Problem Relation Age of Onset   • Hypertension Father        Social History:    reports that he quit smoking about 50 years ago. He has a 15.00 pack-year smoking history. He has never used smokeless tobacco. He reports that he drinks about  "14.0 standard drinks of alcohol per week. He reports that he does not use drugs.   SMOKING STATUS: Non-smoker    Surgical History:     Past Surgical History:   Procedure Laterality Date   • CARDIAC CATHETERIZATION      2018 with stent   • CERVICAL LAMINECTOMY DECOMPRESSION POSTERIOR N/A 2/5/2020    Procedure: CERVICAL LAMINECTOMY DECOMPRESSION POSTERIOR C3;  Surgeon: Marcell Barrow MD;  Location: [facility] OR;  Service: Neurosurgery;  Laterality: N/A;   • COLONOSCOPY  2017   • CORONARY STENT PLACEMENT     • EYE SURGERY Bilateral     bilateral cataract   • HERNIA REPAIR Bilateral     inguinal    • KNEE SURGERY Right     times    • LUMBAR DISCECTOMY Left 6/2/2020    Procedure: HEMILAMINECTOMY  L3-4;  Surgeon: Marcell Barrow MD;  Location: [facility] OR;  Service: Neurosurgery;  Laterality: Left;   • NERVE BLOCK     • ROTATOR CUFF REPAIR Right    • SKIN BIOPSY         Allergies:   Patient has no known allergies.    Physical Exam:    Vital Signs:Ht 188 cm (74\")   Wt 87.8 kg (193 lb 9.6 oz)   BMI 24.86 kg/m²    BMI: Body mass index is 24.86 kg/m².       Incision:   The incision is well-healed and well approximated.  No signs of infection, bleeding, or erythema.    Musculoskeletal:                                            Dorsiflexion is 5/5 Bilaterally                    Plantarflexion is 5/5 bilaterally                    Hip Flexion 5/5 bilaterally.                        Neurologic:                                         The patient is alert and oriented by 3.                       Sensation is equal bilaterally with no deficit.                        Reflexes:  2+ throughout       Medical Decision Making    Data Review:   No new films reviewed at this visit    Diagnosis:   Cervical myelopathy   Status post cervical laminectomy  Neurogenic claudication   Status post L3-4 hemilaminectomy    Treatment Options:   Patient is very excited to get back to normal activity.  Patient's neurogenic claudication has " improved drastically patient only has mild lumbar pain intermittently.    Patient is going to resume and continue with physical therapy and start working on getting back to activities of daily living.  Currently I see no reason for follow-up he is doing fantastic.    Patient's Body mass index is 24.86 kg/m². BMI is within normal parameters. No follow-up required..     Diagnosis Plan   1. Chronic cervical radiculopathy     2. Lumbar stenosis with neurogenic claudication     3. Spinal stenosis of cervical region

## 2020-07-17 ENCOUNTER — TREATMENT (OUTPATIENT)
Dept: PHYSICAL THERAPY | Facility: CLINIC | Age: 80
End: 2020-07-17

## 2020-07-17 DIAGNOSIS — M48.062 LUMBAR STENOSIS WITH NEUROGENIC CLAUDICATION: Primary | ICD-10-CM

## 2020-07-17 PROCEDURE — 97110 THERAPEUTIC EXERCISES: CPT | Performed by: PHYSICAL THERAPIST

## 2020-07-17 NOTE — PROGRESS NOTES
Physical Therapy Daily Progress Note        Patient: Frank Gunderson   : 1940  Diagnosis/ICD-10 Code:  Lumbar stenosis with neurogenic claudication [M48.062]  Referring practitioner: Ania King PA-C  Date of Initial Visit: Type: THERAPY  Noted: 2020  Today's Date: 2020  Patient seen for 2 sessions         Patient reports: he has been to the driving range x2, no increase in pain but notable back fatigue. His pain level is 0/10 upon arrival.    Objective   Exercise 1  Exercise Name 1: (P) NuStep  Equipment/Resistance 1: (P) L6  Time: (P) 8 mins  Exercise 2  Exercise Name 2: (P) TB wall slides  Exercise 3  Exercise Name 3: (P) chops  Equipment/Resistance 3: (P) BTB  Sets/Reps 3: (P) 2*10 B  Exercise 4  Exercise Name 4: (P) lifts  Exercise 5  Exercise Name 5: (P) HEP review     Assessment & Plan     Assessment  Assessment details: Attempted functional strengthening in TK but had to d/c d/t pt's R knee pain. Pt requiring mod VC/TC for correction of exs technique with HEP. Pt tolerated PNF asymmetric D2 against resistance well to simulate golfing activities, no increase in pain.    Plan  Plan details: Plan to progress HEP incorporating trunk rotation and diagonal UE movement to facilitate return to recreational activities.       Timed:  Manual Therapy:    0     mins  40917;  Therapeutic Exercise:    45     mins  33488;     Neuromuscular Belle:    0    mins  34492;    Therapeutic Activity:     0     mins  89253;     Gait Trainin     mins  03005;     Ultrasound:     0     mins  59201;    Electrical Stimulation:    0     mins  08205 ( );    Untimed:  Electrical Stimulation:    0     mins  39928 ( );  Mechanical Traction:    0     mins  30669;     Timed Treatment:   45   mins   Total Treatment:     45   mins  Adrienne Gibson Physical Therapist Student completed treatment under my direct supervision.     2020        Kj Diop, PT  Physical Therapist

## 2020-07-21 ENCOUNTER — TREATMENT (OUTPATIENT)
Dept: PHYSICAL THERAPY | Facility: CLINIC | Age: 80
End: 2020-07-21

## 2020-07-21 DIAGNOSIS — M48.062 LUMBAR STENOSIS WITH NEUROGENIC CLAUDICATION: Primary | ICD-10-CM

## 2020-07-21 DIAGNOSIS — M48.062 SPINAL STENOSIS OF LUMBAR REGION WITH NEUROGENIC CLAUDICATION: ICD-10-CM

## 2020-07-21 PROCEDURE — 97110 THERAPEUTIC EXERCISES: CPT | Performed by: PHYSICAL THERAPIST

## 2020-07-21 NOTE — PROGRESS NOTES
Physical Therapy Daily Progress Note/DISCHARGE        Patient: Frank Gunderson   : 1940  Diagnosis/ICD-10 Code:  Lumbar stenosis with neurogenic claudication [M48.062]  Referring practitioner: Ania King PA-C  Date of Initial Visit: Type: THERAPY  Noted: 2020  Today's Date: 2020  Patient seen for 3 sessions         Patient reports improvement in sx with no issues this week even after playing 9 holes of golf yesterday. Pt reports being able to stand for 30 minutes without increase in sx.  Pain level is 0/10 upon arrival.          Objective     Pt tolerated elliptical without an increase in sx. Pt tolerated higher level rotation and trunk extension exercises without increase in sx.    See Exercise, Manual, and Modality Logs for complete treatment.        Assessment & Plan     Assessment  Assessment details: Exercises were chosen to mimic a golf stroke as well as functional overhead repetitive tasks. Pt tolerated high level, goal oriented exercises today without sx. Therefore pt will continue HEP independently.   Prognosis: good    Goals  Plan Goals: Short Term Goals (2 weeks)  1. Patient to be compliant with initial HEP  2.  Patient to report pain less than or equal to 2/10 post walking program.  3.  Patient to report decreased pain with trunk extension.    Long Term Goals (4 weeks)  1. Patient to demonstrate lumbar extension ROM to at least 15 degrees lumbar spine.  2. Patient to demonstrate independence with home exercises.  3. Patient to walk at least 2 miles without onset of left hip pain.  4. Patient to improve Modified Oswestry score to at least 6%.      Plan  Therapy options: will be seen for skilled physical therapy services  Treatment plan discussed with: patient  Plan details: Pt discharged due to tolerance of high level tasks without sx.         All STG met  LTG 1, 4 not assessed, progressing towards 3, met 2.          Timed:  Manual Therapy:    0     mins  20711;  Therapeutic  Exercise:    35     mins  44653;     Neuromuscular Belle:    0    mins  89147;    Therapeutic Activity:     0     mins  21020;     Gait Trainin     mins  34705;     Ultrasound:     0     mins  02628;    Electrical Stimulation:    0     mins  19622 ( );    Untimed:  Electrical Stimulation:    0     mins  51993 ( );  Mechanical Traction:    0     mins  41537;     Timed Treatment:   35   mins   Total Treatment:     35   mins    Aaliyah Harvey, Physical Therapist Student completed treatment under my direct supervision.     2020        Kj Diop, PT  Physical Therapist

## 2020-08-02 ENCOUNTER — APPOINTMENT (OUTPATIENT)
Dept: PREADMISSION TESTING | Facility: HOSPITAL | Age: 80
End: 2020-08-02

## 2020-08-02 PROCEDURE — U0002 COVID-19 LAB TEST NON-CDC: HCPCS

## 2020-08-02 PROCEDURE — C9803 HOPD COVID-19 SPEC COLLECT: HCPCS

## 2020-08-02 PROCEDURE — U0004 COV-19 TEST NON-CDC HGH THRU: HCPCS

## 2020-08-03 LAB
REF LAB TEST METHOD: NORMAL
SARS-COV-2 RNA RESP QL NAA+PROBE: NOT DETECTED

## 2020-08-05 ENCOUNTER — AMBULATORY SURGICAL CENTER (OUTPATIENT)
Dept: URBAN - METROPOLITAN AREA SURGERY 10 | Facility: SURGERY | Age: 80
End: 2020-08-05

## 2020-08-05 ENCOUNTER — OFFICE (OUTPATIENT)
Dept: URBAN - METROPOLITAN AREA PATHOLOGY 4 | Facility: PATHOLOGY | Age: 80
End: 2020-08-05

## 2020-08-05 DIAGNOSIS — K64.0 FIRST DEGREE HEMORRHOIDS: ICD-10-CM

## 2020-08-05 DIAGNOSIS — D12.2 BENIGN NEOPLASM OF ASCENDING COLON: ICD-10-CM

## 2020-08-05 DIAGNOSIS — D12.4 BENIGN NEOPLASM OF DESCENDING COLON: ICD-10-CM

## 2020-08-05 DIAGNOSIS — D12.3 BENIGN NEOPLASM OF TRANSVERSE COLON: ICD-10-CM

## 2020-08-05 DIAGNOSIS — K57.90 DIVERTICULOSIS OF INTESTINE, PART UNSPECIFIED, WITHOUT PERFO: ICD-10-CM

## 2020-08-05 DIAGNOSIS — Z86.010 PERSONAL HISTORY OF COLONIC POLYPS: ICD-10-CM

## 2020-08-05 PROCEDURE — 88305 TISSUE EXAM BY PATHOLOGIST: CPT | Performed by: INTERNAL MEDICINE

## 2020-08-05 PROCEDURE — 45385 COLONOSCOPY W/LESION REMOVAL: CPT | Mod: PT | Performed by: INTERNAL MEDICINE

## 2020-08-06 PROBLEM — Z86.010 PERSONAL HISTORY OF COLON POLYPS: Status: ACTIVE | Noted: 2020-08-06

## 2021-12-30 PROCEDURE — U0005 INFEC AGEN DETEC AMPLI PROBE: HCPCS | Performed by: EMERGENCY MEDICINE

## 2021-12-30 PROCEDURE — U0004 COV-19 TEST NON-CDC HGH THRU: HCPCS | Performed by: EMERGENCY MEDICINE

## (undated) DEVICE — 3M™ WARMING BLANKET, UPPER BODY, 10 PER CASE, 42268: Brand: BAIR HUGGER™

## (undated) DEVICE — SPNG GZ WOVN 4X4IN 12PLY 10/BX STRL

## (undated) DEVICE — 3M™ DURAPORE™ SURGICAL TAPE 1538-3, 3 INCH X 10 YARD (7,5CM X 9,1M), 4 ROLLS/BOX: Brand: 3M™ DURAPORE™

## (undated) DEVICE — DRP MICROSCOP ELIMINATES GLAS COVR

## (undated) DEVICE — 3M™ STERI-DRAPE™ INSTRUMENT POUCH 1018: Brand: STERI-DRAPE™

## (undated) DEVICE — ELECTRD BLD EDGE/INSUL1P 2.4X5.1MM STRL

## (undated) DEVICE — TOOL T12MH25M LEGEND 12CM 2.5MM MH 2FLT: Brand: MIDAS REX ™

## (undated) DEVICE — PAD GRND REM POLYHESIVE A/ DISP

## (undated) DEVICE — TOOL 14MH30 LEGEND 14CM 3MM: Brand: MIDAS REX ™

## (undated) DEVICE — APPL CHLORAPREP W/TINT 26ML BLU

## (undated) DEVICE — Device

## (undated) DEVICE — DRSNG SURESITE123 4X4.8IN

## (undated) DEVICE — NDL HYPO ECLPS SFTY 25G 1 1/2IN

## (undated) DEVICE — SYR CONTRL LUERLOK 10CC

## (undated) DEVICE — HOLDER: Brand: DEROYAL

## (undated) DEVICE — HDRST INTUB GENTLETOUCH SLOT 7IN RT

## (undated) DEVICE — 3M™ STERI-STRIP™ REINFORCED ADHESIVE SKIN CLOSURES, R1547, 1/2 IN X 4 IN (12 MM X 100 MM), 6 STRIPS/ENVELOPE: Brand: 3M™ STERI-STRIP™

## (undated) DEVICE — C-ARM: Brand: UNBRANDED

## (undated) DEVICE — COVER,LIGHT HANDLE,FLX,1/PK: Brand: MEDLINE INDUSTRIES, INC.

## (undated) DEVICE — NDL HYPO ECLPS SFTY 22G 1 1/2IN

## (undated) DEVICE — 1010 S-DRAPE TOWEL DRAPE 10/BX: Brand: STERI-DRAPE™

## (undated) DEVICE — ELECTRD BLD EXT EDGE/INSUL 6IN

## (undated) DEVICE — MAYFIELD® DISPOSABLE ADULT SKULL PIN (PLASTIC BASE): Brand: MAYFIELD®

## (undated) DEVICE — DRSNG WND GZ PAD BORDERED 4X8IN STRL

## (undated) DEVICE — THE FLOSEAL MALLEABLE TIP AND TRIMMABLE TIP ARE INTENDED FOR DELIVERY OF FLOSEAL HEMOSTATIC MATRIX.: Brand: FLOSEAL SPECIAL APPLICATOR TIPS

## (undated) DEVICE — TOOL 14BA60 LEGEND 14CM 6MM: Brand: MIDAS REX ™

## (undated) DEVICE — GLV SURG PREMIERPRO MIC LTX PF SZ8 BRN

## (undated) DEVICE — PK NEURO DISC 10

## (undated) DEVICE — STRAP POSTN KN/BDY FM 5X72IN DISP

## (undated) DEVICE — PROXIMATE RH ROTATING HEAD SKIN STAPLERS (35 WIDE) CONTAINS 35 STAINLESS STEEL STAPLES: Brand: PROXIMATE

## (undated) DEVICE — JP PERF DRN SIL FLT 7MM FULL: Brand: CARDINAL HEALTH

## (undated) DEVICE — PAD ARMBRD SURG CONVOL 7.5X20X2IN

## (undated) DEVICE — SCRB SURG BACTOSHIELD CHG 4PCT 4OZ

## (undated) DEVICE — RUBBERBAND LF STRL PK/2

## (undated) DEVICE — ANTIBACTERIAL UNDYED BRAIDED (POLYGLACTIN 910), SYNTHETIC ABSORBABLE SUTURE: Brand: COATED VICRYL

## (undated) DEVICE — JACKSON-PRATT 100CC BULB RESERVOIR: Brand: CARDINAL HEALTH

## (undated) DEVICE — GLV SURG BIOGEL LTX PF 8

## (undated) DEVICE — NEEDLE, QUINCKE 22GX3.5": Brand: MEDLINE INDUSTRIES, INC.

## (undated) DEVICE — DISPOSABLE BIPOLAR FORCEPS 7 3/4" (19.7CM) SCOVILLE BAYONET, INSULATED, 1.5MM TIP AND 12 FT. (3.6M) CABLE: Brand: KIRWAN

## (undated) DEVICE — SUT VIC 0 UR6 27IN VCP603H

## (undated) DEVICE — ADHS LIQ MASTISOL 2/3ML